# Patient Record
Sex: FEMALE | Race: BLACK OR AFRICAN AMERICAN | NOT HISPANIC OR LATINO | Employment: UNEMPLOYED | URBAN - METROPOLITAN AREA
[De-identification: names, ages, dates, MRNs, and addresses within clinical notes are randomized per-mention and may not be internally consistent; named-entity substitution may affect disease eponyms.]

---

## 2018-01-10 NOTE — PROGRESS NOTES
AUG 10 2016         RE: Teresa Wood                                To: ELLA Randhawa    MR#: [id number]                                   [address]   : [address] [facility],  [address]   ENC: [id number]                             Fax: [phone]   (Exam #: [id number])      The LMP of this 38year old,  G2, P0-0-1-0 patient was unknown, her   working ALFONSO is AUG 26 2016 and the current gestational age is 42 weeks 5   days by 2rd Trimester Sono  The patient has a history of one ectopic   pregnancy  A sonographic examination was performed on AUG 10 2016 using   real time equipment  The ultrasound examination was performed using   abdominal technique  The patient has a BMI of 30 6  Her blood pressure   today was 125/82  Earliest ultrasound found in her record: 16 8w5d 16 ALFONSO      Problem list:   1  AMA - normal NIPT  2  IUGR      Cardiac motion was observed at 147 bpm       INDICATIONS      advanced maternal age   breech presentation   intrauterine growth restriction   obesity      Exam Types      Doppler study   Biophysical Profile   Amniotic Fluid Index      RESULTS      Fetus # 1 of 1   Breech presentation   Placenta Location = Fundal   No placenta previa   Placenta Grade = II      AMNIOTIC FLUID      Q1: 4 3      Q2: 1 9      Q3:          Q4:   JORGE Total = 6 2 cm   Amniotic Fluid: Normal      FETAL VESSELS                                     S/D   PI    RI    PSV   AEDV RF                                                    cm/s       Umbilical Artery           2 58  0 93  0 61       Middle Cerebral Artery     3 40  1 43  0 71      ANATOMY COMMENTS      The umbilical artery dopplers are normal for gestational age  The middle   cerebral artery dopplers are normal for gestational age  There is no   cephalization of flow seen  BIOPHYSICAL PROFILE      The Biophysical Profile score was 8/8     Breathin  Movement: 2 Tone: 2  AFV: 2      IMPRESSION      Gillespie IUP   37 weeks and 5 days by 3rd Trimester Sono  (ALFONSO=AUG 26 2016)   Breech presentation   Regular fetal heart rate of 147 bpm   Fundal placenta   No placenta previa      GENERAL COMMENT      Her follow up care should include twice weekly NST's and a once weekly   amniotic fluid assessment, along with her daily kick counts  AN US for   growth and dopplers is planned for 8/17/16  TEX Araujo M D     Maternal-Fetal Medicine   Electronically signed 08/10/16 15:17

## 2018-01-10 NOTE — PROGRESS NOTES
History of Present Illness  HPI: Please see her ultrasound report under the radiology tab  Active Problems    1  Elderly multigravida in first trimester (659 63) (O09 521)   2  Unsure of last menstrual period as reason for ultrasound scan (V28 3) (Z36)    Family History    1  No pertinent family history    2  No pertinent family history    Social History    · Never a smoker    Current Meds   1  Prenatal Vitamin TABS; TAKE 1 TABLET DAILY; Therapy: (Recorded:20Jan2016) to Recorded    Allergies    1  No Known Drug Allergies    2  No Known Environmental Allergies   3   No Known Food Allergies    Signatures   Electronically signed by : CLIFF Ibrahim MD; Apr 27 2016  9:47AM EST                       (Author)

## 2018-01-10 NOTE — PROGRESS NOTES
2016         RE: Dallin Ferrara                                To: ELLA Cowan    MR#: [id number]                                   [address]   : [address], [address]   SS#: *********                                    Fax: [phone]   ENC: [id number]   (Exam #: [id number])      The LMP of this 38year old,  G3, P0-0-1-0 patient was unknown, her   working ALFONSO is AUG 32 2016 and the current gestational age is 25 weeks 5   days by 2rd Trimester Sono  The patient has a history of one ectopic   pregnancy  A sonographic examination was performed on 2016 using   real time equipment  The ultrasound examination was performed using   abdominal & vaginal techniques  The patient has a BMI of 31 9  Her blood   pressure today was 127/80  Earliest ultrasound found in her record: 16 8w5d 16 ALFONSO      Problem list:   1  Advanced maternal age   Her early glucola was normal  NIPT and her MSAFP   were normal       Cardiac motion was observed at 164 bpm       INDICATIONS      advanced maternal age   obesity   fetal anatomical survey      Exam Types      Transvaginal   LEVEL II      RESULTS      Fetus # 1 of 1   Breech presentation   Fetal growth appeared normal   Placenta Location = Anterior   No placenta previa   Placenta Grade = I      MEASUREMENTS (* Included In Average GA)      AC              17 2 cm        21 weeks 6 days* (34%)   BPD              5 2 cm        21 weeks 6 days* (27%)   HC              20 1 cm        22 weeks 1 day * (31%)   Femur            4 1 cm        23 weeks 4 days* (57%)      Nuchal Fold      3 9 mm      Humerus          3 7 cm        22 weeks 6 days  (49%)   Radius           3 2 cm        23 weeks 6 days   Ulna             3 4 cm        23 weeks 1 day   Tibia            3 3 cm        22 weeks 1 day   (26%)   Fibula           3 2 cm        20 weeks 6 days   Foot             4 0 cm 22 weeks 5 days      Cerebellum       2 6 cm        24 weeks 4 days   Biorbit          3 5 cm        22 weeks 3 days   CisternaMagna    6 7 mm      HC/AC           1 17   FL/AC           0 24   FL/BPD          0 79   EFW (Ac/Fl/Hc)   516 grams - 1 lbs 2 oz                 (36%)      THE AVERAGE GESTATIONAL AGE is 22 weeks 2 days +/- 10 days  AMNIOTIC FLUID         Largest Vertical Pocket = 4 1 cm   Amniotic Fluid: Normal      CERVICAL EVALUATION      SUPINE      Cervical Length: 3 40 cm      OTHER TEST RESULTS           Funneling?: No             Dynamic Changes?: No        Resp  To TFP?: No      ANATOMY      Head                                    Normal   Face/Neck                               See Details   Th  Cav  Normal   Heart                                   See Details   Abd  Cav  Normal   Stomach                                 Normal   Right Kidney                            Normal   Left Kidney                             Normal   Bladder                                 Normal   Abd  Wall                               Normal   Spine                                   Not Visualized   Extrems                                 Normal   Genitalia                               Normal   Placenta                                Normal   Umbl  Cord                              Normal   Uterus                                  Normal   PCI                                     Normal      ANATOMY DETAILS      Visualized Appearing Sonographically Normal:   HEAD: (Calvarium, BPD Level, Lateral Ventricles, Choroid Plexus,   Cerebellum, Cisterna Magna);    FACE/NECK: (Neck, Nuchal Fold, Orbits,   Nose/Lips, Palate, Face);    TH  CAV : (Diaphragm);     HEART: (3 Vessel   Trachea, Four Chamber View, Proximal Left Outflow, Proximal Right Outflow,   Aortic Arch, Short Axis of Greater Vessels, Cardiac Axis, Interventricular   Septum, Interatrial Septum, Cardiac Position);    ABD  CAV , STOMACH,   RIGHT KIDNEY, LEFT KIDNEY, BLADDER, ABD  WALL, EXTREMS: (Lt Humerus, Rt   Humerus, Lt Forearm, Rt Forearm, Lt Hand, Rt Hand, Lt Femur, Rt Femur, Lt   Low Leg, Rt Low Leg, Lt Foot, Rt Foot);    GENITALIA, PLACENTA, UMBL  CORD, UTERUS, PCI      Not Visualized:   FACE/NECK: (Profile); HEART: (Ductal Arch);    SPINE      ANATOMY COMMENTS      No fetal structural abnormality or ultrasound marker for aneuploidy is   identified on the Level II ultrasound study today  The views of the spine,   profile, nasal bone and ductal arch are limited by fetal position  The   genitalia were reviewed and found to be female  The patient is aware of   the results of the fetal sex  Fetal growth and amniotic fluid volume   appear normal   Active movement of the fetal body & extremities was seen  There is no suspicion of a subchorionic bleed  The placental cord   insertion was normal/ marginal   The cervix seen by  transvaginal scan   appears normal measuring 34mms  There is no evidence for spontaneous   funneling of the cervix seen  ADNEXA      The left ovary appeared normal and measured 4 1 x 2 1 x 2 6 cm with a   volume of 11 7 cc  The right ovary was not visualized  IMPRESSION      Gillespie IUP   22 weeks and 2 days by this ultrasound  (ALFONSO=AUG 29 2016)   22 weeks and 5 days by 3rd Trimester Sono  (ALFONSO=AUG 26 2016)   Breech presentation   Fetal growth appeared normal   Regular fetal heart rate of 164 bpm   Anterior placenta   No placenta previa      GENERAL COMMENT      The patient was informed of the findings and counseled about the   limitations of the exam in detecting all forms of fetal congenital   abnormalities  She denies any vaginal bleeding or uterine cramping/contractions  She does   feel fetal movement  Exam shows she is comfortable and her abdomen is non tender  Follow up recommended:   1    A follow-up growth scan is planned for 32 weeks secondary to her   age  We'll review the missed anatomy at that time      TEX Villaseñor M D     Maternal-Fetal Medicine   Electronically signed 04/27/16 15:44

## 2018-01-11 NOTE — RESULT NOTES
Verified Results  (Q) QNATAL (TM) ADVANCED [id number] 09:10AM Celina Chung     Test Name Result Flag Reference   NUMBER OF FETUSES 1     ADVANCED MATERNAL AGE? YES     ABNORMAL RADHA? NOT GIVEN     ABNORMAL US? NOT GIVEN     PERSONAL/FAM HISTORY? NOT GIVEN     INTERPRETATION      This specimen showed expected representation of chromosome  21, 18, and 13 material    TRISOMY 21 (T21) Negative     TRISOMY 18 (T18) Negative     TRISOMY 13 (T13) Negative     T CHROMOSOME Not detected     T CHR  INTERPRETATION      Consistent with a female fetus  SEX CHROMOSOME No aneuploidy     SEX CHROMOSOME INTERP      No apparent abnormality was detected  See "Limitations"  below  MICRODELETION Not detected     MICRODELETION INTERP      No apparent abnormality was detected  See "Limitations"  below  GESTATION AGE (IN WEEKS) 12     GESTATIONAL AGE (IN DAYS) 5     FETAL FRACTION Sufficient     LABORATORY COMMENTS      Laboratory results and submitted clinical information  reviewed by Bashir Flores MD, [place], [place] [facility]  LIMITATIONS      LIMITATIONS  This test has been validated on women with a reyes  pregnancy that is >=10 weeks gestational age  As such, the  accuracy of this test for specimens drawn at less than 10  weeks gestation is unknown  In addition, there are limited  data available for the performance of this test in multiple  gestation pregnancies and for the detection of  microdeletions  Specimens are analyzed for aneuploidies  involving chromosomes 21, 18, 13, X, and Y, and  microdeletions of the specified regions only  The Y  chromosome is analyzed for the determination of fetal sex,  and the sensitivity and specificity of this analysis may be  less than that of the autosome analysis  Sex chromosome  aneuploidy analysis is not performed for multiple gestation  pregnancies   Aneuploidies involving chromosomes other than  those specified above or abnormalities involving chromosomal  regions other than those specified are not included in this  testing  While the results of this test are highly accurate,  not all of the chromosome abnormalities interrogated may be  detected due to maternal, placental, or fetal mosaicism, or  other unexplained causes  The accuracy of the test results  may also be affected by the presence of chromosome  abnormalities or copy number variations that are maternal in  origin, or by vanishing twin syndrome in a multiple  gestation pregnancy  Circulating cell-free fetal DNA  screening does not replace the precision of diagnosis using  chorionic villus sampling or amniocentesis  It does not  assess the risk of fetal anomalies such as neural tube  defects or ventral wall defects, and should not be  considered in isolation from other clinical findings and  laboratory test results  Management decisions, including  pregnancy termination, should not be based solely on the  results of cell-free DNA screening  A negative test result  does not ensure an unaffected pregnancy  The healthcare  provider is responsible for the use of this information in  the management of his/her patient  Health care providers, please contact your local [facility] genetic counselor or call [employer] [facility] [employer] [facility]  [facility] at [employer] ([phone]) for assistance with  interpretation of these results  SPECIFICATIONS      Sensitivity           Specificity  T21           99 1%                 99 9%           CI:[96 3%-99 8%]     CI:[99 6%-99 9%]  T18          >99 9%                 99 6%           CI:[92 4%-100%]      CI:[99 2%-99 8%]  T13           91 7%                  99  7%           CI:[59 7-99 6%]      CI:[99 3%-99 9%]              Accuracy  Y             99 4%           CI:[99 0-99 6%]   METHODOLOGY      Circulating cell-free (cf) DNA was isolated from plasma  It  was then detected on a massively parallel sequencing  platform   Bioinformatic analysis was performed to determine  the representation of fetal DNA in the specimen, especially  fetal material from chromosomes 21, 18, and 13  The  representation of other fetal material, including the sex  chromosomes (X and Y) and select chromosomal regions (22q,  15q, 11q, 8q, 5p, 4p 1p), was also evaluated and will only  be reported as "Additional Chromosome Results" when an  abnormality is detected  This test was developed and its  performance characteristics have been determined by [facility], [facility] [address]  It has  not been cleared or approved by the [place]  Food and Drug  Administration  The FDA has determined that such clearance  or approval is not necessary  Performance characteristics  refer to the analytical performance of the test  This test  is performed pursuant to a license agreement with [facility]  This test was developed and its analytical performance  characteristics [facility]  It has not been  cleared or approved by FDA  This assay has been validated  pursuant to the CLIA regulations and is used for clinical  purposes

## 2018-01-15 NOTE — PROGRESS NOTES
AUG 17 2016         RE: Bebe Saravia                                To: ELLA Cox    MR#: [id number]                                   [address]   : [address], [address]   SS#: *********                                    Fax: [phone]   ENC:   (Exam #: [id number])      The LMP of this 38year old,  G2, P0-0-1-0 patient was unknown, her   working ALFONSO is AUG 26 2016 and the current gestational age is 37 weeks 5   days by 2rd Trimester Sono  The patient has a history of one ectopic   pregnancy  A sonographic examination was performed on AUG 17 2016 using   real time equipment  The ultrasound examination was performed using   abdominal technique  The patient has a BMI of 30 6  Her blood pressure   today was 130/81  Earliest ultrasound found in her record: 16 8w5d 16 ALFONSO      Problem list:   1  AMA - normal NIPT  2  IUGR resolved      Cardiac motion was observed at 141 bpm       INDICATIONS      advanced maternal age   breech presentation   intrauterine growth restriction   obesity   fetal growth   missed anatomy follow up      Exam Types      Doppler study   Level I      RESULTS      Fetus # 1 of 1   Breech presentation   Fetal growth appeared normal   Placenta Location = Fundal   No placenta previa   Placenta Grade = II      MEASUREMENTS (* Included In Average GA)      AC              31 6 cm        35 weeks 5 days* (7%)   BPD              9 0 cm        36 weeks 4 days* (30%)   HC              33 4 cm        37 weeks 4 days* (35%)   Femur            7 3 cm        36 weeks 6 days* (37%)      Humerus          6 1 cm        35 weeks 2 days  (17%)      Cerebellum       5 0 cm        36 weeks 4 days      HC/AC           1 06   FL/AC           0 23   FL/BPD          0 81   EFW (Ac/Fl/Hc)  2943 grams - 6 lbs 8 oz                 (19%)      THE AVERAGE GESTATIONAL AGE is 36 weeks 5 days +/- 21 days        AMNIOTIC FLUID      Q1: 1 7      Q2: 2 5      Q3: 2 3      Q4: 1 6   JORGE Total = 8 0 cm   Amniotic Fluid: Normal      FETAL VESSELS                                     S/D   PI    RI    PSV   AEDV RF                                                    cm/s       Umbilical Artery                 0 93              No   No       Middle Cerebral Artery     4 19  1 46  0 76        No      ANATOMY DETAILS      Visualized Appearing Sonographically Normal:   HEAD: (Calvarium, BPD Level, Lateral Ventricles);    STOMACH, RIGHT   KIDNEY, LEFT KIDNEY, BLADDER, PLACENTA      Visualized:   HEAD: (Cerebellum)      Not Visualized:   HEAD: (Cavum); HEART: (Four Chamber View, Proximal Left Outflow,   Proximal Right Outflow, Cardiac Axis, Cardiac Position)      ANATOMY COMMENTS             The views of the Spine which was limited in the prior anatomic evaluation   is still limited today and therefore the fetal anatomic survey could still   not be completed  BIOPHYSICAL PROFILE      The Biophysical Profile score was 8/8  Breathin  Movement: 2  Tone: 2  AFV: 2      IMPRESSION      Gillespie IUP   36 weeks and 5 days by this ultrasound  (ALFONSO=SEP 9 2016)   38 weeks and 5 days by 3rd Trimester Sono  (ALFONSO=AUG 26 2016)   Breech presentation   Fetal growth appeared normal   Regular fetal heart rate of 141 bpm   Fundal placenta   No placenta previa      GENERAL COMMENT      On exam today the patient appears well, in no acute distress, and denies   any complaints  Her abdomen is non-tender  There has been appropriate interval fetal growth  There is no longer   concern for fetal growth restriction  Good fetal movement and tone are   seen    The amniotic fluid volume appears normal   The placenta is fundal   and it appears sonographically normal   The anatomic structures listed   above could not be optimally imaged today because of fetal position;   however, these structures were seen on a prior ultrasound and appeared sonographically normal   The patient was informed of today's findings and   all of her questions were answered  The limitations of ultrasound were   reviewed with the patient  Recommend further ultrasounds as clinically indicated  Recommend   discontinuation of nonstress testing  Thank you very much for allowing us to participate in the care of this   very nice patient  Should you have any questions, please do not hesitate   to contact our office  TEX Vega M D     Electronically signed 08/17/16 10:36

## 2018-01-16 NOTE — PROGRESS NOTES
2016         RE: Cheri Murphy                                 To: Jaja Perera, M D    MR#: [id number]                                  [address] [place] [address] Drive   : [address], [address]   SS#:                                              Fax: [phone]   ENC:   (Exam #: [id number])      The LMP of this 38year old,  1, para 0 patient was unknown, her   working ALFONSO is AUG 26 2016 and the current gestational age is 11 weeks 5   days by [address] [place] [address]  A sonographic examination was performed on 2016 using real time equipment  The ultrasound examination was   performed using abdominal & vaginal techniques  The patient has a BMI of   29 3  Her blood pressure today was 122/77  MFM ultrasound 16   8w5d   ALFONSO   16      Patient reports having one other pregnancy  Her records show she had 1   ectopic and 1 spontaneous miscarriage  She was very slow today and could   not elaborate on whether she had an ectopic or not  She reports a history   of depression and anxiety  She is also advanced maternal age  She denies   being on medication for depression in the pregnancy  She reports being on   PNV and denies any allergies  She denies any past medical or surgical   history  She denies any family hx of hypertension, diabetes, thrombosis or   fetal anomalies  Multiple longitudinal and transverse sections revealed a   reyes intrauterine pregnancy  A normal gestational sac was documented  A normal fetal pole was   visualized  Cardiac motion was observed at 150 bpm  The yolk sac was seen,   measuring 0 53 cm  The amnion was also documented  INDICATIONS      viability      Exam Types      Level I   Transvaginal      MEASUREMENTS (* Included In Average GA)      CRL              2 1 cm        8 weeks 5 days *      THE AVERAGE GESTATIONAL AGE is 8 weeks 5 days +/- 5 days        ADNEXA      The left ovary appeared normal and measured 2 3 x 2 1 x 2 0 cm with a   volume of 5 1 cc  The right ovary was not visualized  IMPRESSION      Gillespie IUP   8 weeks and 5 days by this ultrasound  (ALFONSO=AUG 26 2016)   8 weeks and 5 days by 1st Tri Sono  (ALFONSO=AUG 26 2016)   Regular fetal heart rate of 150 bpm      GENERAL COMMENT      As per your request, a consultation was performed on your patient for the   following indication: AMA- Advanced Maternal Age      Risks:   1  AMA- with a  1 % risk for any type of aneuploidy at the time of an   amniocentesis  It was very hard to  patient today about her age   related risks  She is limited mentally  She does not think she would stop   a pregnancy if a baby was mentally slow or retarded  She is not sure if   she would stop a pregnancy for a malformation  I tried to review the following with her that I normally cover with all   patients but I am not sure she grasped my discussion  When I told her the   risk for aneuploidy is 1% or small, she said yes the baby looks very small   on the US today  I discussed that a Sequential Screen may miss 10% of the babies with   Downs syndrome or Trisomy 25 and that it is not a great screen for other   chromosome problems which her age puts her at risk for  Ultrasound is also   not a good screen for aneuploidy as it may miss up to 50% of the babies   with a chromosome problem  High risk patients such as AMA patients, those   with family hx of aneuploidy, an abnormal result on the other genetic   screens or US evidence for malformations have other options such as   invasive genetic screening on the placenta/amniotic fluid or Cell free DNA   screening completed on maternal blood  Cell free DNA screening picks up   99% of babies with Downs and 97% of babies with T18  The false positive   risk for Downs and T18 if found is 0 1%  This test also can  7 of   11 babies with T13 with a false positive rate of 0 2%   This noninvasive   test though does not screen for all types of aneuploidy and needs to be   confirmed by invasive genetic testing  Invasive genetic testing includes   an amniocentesis or CVS and yields the most information on all of the   chromosomes  Risks and benefits of both invasive procedures were reviewed   including an increased risk of loss of 0 5% over the baseline risk  It was   explained that normal chromosomes and a normal ultrasound do not guarantee   a normal baby nor a normal pregnancy outcome  Patient verbalized   understanding and wanted to proceed with Cell free DNA testing  The patient was informed of the findings and counseled about the   limitations of the exam in detecting all forms of fetal congenital   abnormalities  She denies any vaginal bleeding or uterine cramping/contractions  Exam shows she is comfortable and her abdomen is nontender  Follow up recommended:   1  She will return for a 12 week US to review the nuchal area and to have   NIPT drawn  Fetal Level II ultrasound imaging is recommended at 19-20   weeks' gestation  2  Recommend a 32 week growth scan due to the patients risk factor of   being advanced maternal age  3  I recommend a follow-up MSAFP at 16-18 weeks as a Cell free DNA   screening does not screen for neural tube defects  The later can be   ordered locally  TEX Ortiz , ELLA Arboleda     Maternal-Fetal Medicine   Electronically signed 01/20/16 17:27

## 2018-07-11 ENCOUNTER — OFFICE VISIT (OUTPATIENT)
Dept: OBGYN CLINIC | Facility: CLINIC | Age: 38
End: 2018-07-11
Payer: COMMERCIAL

## 2018-07-11 VITALS
HEIGHT: 65 IN | BODY MASS INDEX: 30.66 KG/M2 | WEIGHT: 184 LBS | SYSTOLIC BLOOD PRESSURE: 120 MMHG | DIASTOLIC BLOOD PRESSURE: 80 MMHG

## 2018-07-11 DIAGNOSIS — N94.6 DYSMENORRHEA: Primary | ICD-10-CM

## 2018-07-11 PROCEDURE — 99213 OFFICE O/P EST LOW 20 MIN: CPT | Performed by: NURSE PRACTITIONER

## 2018-07-11 RX ORDER — NAPROXEN 500 MG/1
500 TABLET ORAL 2 TIMES DAILY WITH MEALS
Qty: 30 TABLET | Refills: 3 | Status: SHIPPED | OUTPATIENT
Start: 2018-07-11 | End: 2018-07-30 | Stop reason: SDDI

## 2018-07-11 NOTE — PROGRESS NOTES
PROBLEM GYNECOLOGICAL VISIT    Feliciano Palomo is a 44 y o  female who presents today with complaint of painful periods  Her general medical history has been reviewed and she reports it as follows:    Past Medical History:   Diagnosis Date    Depression      Past Surgical History:   Procedure Laterality Date     SECTION  2016     OB History      Para Term  AB Living    1 1 1 0 0 1    SAB TAB Ectopic Multiple Live Births    0 0 0 0 1        Social History   Substance Use Topics    Smoking status: Never Smoker    Smokeless tobacco: Never Used    Alcohol use No       Current Outpatient Prescriptions: no current medications    History of Present Illness:   Reports painful menses for past year that come every month and last 4 days; last dose Depoprovera was 3/2017; no contraception since then - not sexually active    Review of Systems:  /80 (BP Location: Right arm, Patient Position: Sitting)   Ht 5' 5" (1 651 m)   Wt 83 5 kg (184 lb)   LMP 2018 (Exact Date)   BMI 30 62 kg/m²   Review of Systems   Constitutional: Negative  Gastrointestinal: Negative  Genitourinary: Positive for menstrual problem and pelvic pain  Negative for difficulty urinating and vaginal discharge  Skin: Negative  Physical Exam:  OBGyn Exam deferred as patient currently has her menses    Assessment:   1  dysmenorrhea    Plan:   1  Given Rx Naproxen for prn use  2  Discussed option of OCP but patient decline at this time  3  Return to office 2019 for annual and prn

## 2018-07-12 ENCOUNTER — TELEPHONE (OUTPATIENT)
Dept: OBGYN CLINIC | Facility: CLINIC | Age: 38
End: 2018-07-12

## 2018-07-12 DIAGNOSIS — N94.6 DYSMENORRHEA: Primary | ICD-10-CM

## 2018-07-12 RX ORDER — NORETHINDRONE ACETATE AND ETHINYL ESTRADIOL 1MG-20(21)
1 KIT ORAL DAILY
Qty: 28 TABLET | Refills: 3 | Status: SHIPPED | OUTPATIENT
Start: 2018-07-12 | End: 2018-07-30 | Stop reason: SDDI

## 2018-07-12 NOTE — TELEPHONE ENCOUNTER
I spoke with patient and she tells me that Naproxen has not helped much but menses should be done tomorrow  She has thought about it and decided she would like to try OCP's to help with dysmenorrhea as we discussed at her office visit yesterday  Rx Junel Fe 1/20 sent to her pharmacy and I advised her to start today  Return for follow up appointment in 3 months  She agrees

## 2018-07-30 ENCOUNTER — OFFICE VISIT (OUTPATIENT)
Dept: FAMILY MEDICINE CLINIC | Facility: CLINIC | Age: 38
End: 2018-07-30
Payer: COMMERCIAL

## 2018-07-30 VITALS
RESPIRATION RATE: 20 BRPM | WEIGHT: 183.8 LBS | HEART RATE: 100 BPM | DIASTOLIC BLOOD PRESSURE: 80 MMHG | BODY MASS INDEX: 32.57 KG/M2 | HEIGHT: 63 IN | OXYGEN SATURATION: 100 % | TEMPERATURE: 98.4 F | SYSTOLIC BLOOD PRESSURE: 110 MMHG

## 2018-07-30 DIAGNOSIS — N30.00 ACUTE CYSTITIS WITHOUT HEMATURIA: Primary | ICD-10-CM

## 2018-07-30 LAB
SL AMB  POCT GLUCOSE, UA: NEGATIVE
SL AMB LEUKOCYTE ESTERASE,UA: ABNORMAL
SL AMB POCT BILIRUBIN,UA: NEGATIVE
SL AMB POCT BLOOD,UA: NEGATIVE
SL AMB POCT CLARITY,UA: ABNORMAL
SL AMB POCT COLOR,UA: ABNORMAL
SL AMB POCT KETONES,UA: NEGATIVE
SL AMB POCT NITRITE,UA: NEGATIVE
SL AMB POCT PH,UA: 5
SL AMB POCT SPECIFIC GRAVITY,UA: 1.03
SL AMB POCT URINE HCG: NEGATIVE
SL AMB POCT URINE PROTEIN: ABNORMAL
SL AMB POCT UROBILINOGEN: NEGATIVE

## 2018-07-30 PROCEDURE — 81002 URINALYSIS NONAUTO W/O SCOPE: CPT | Performed by: FAMILY MEDICINE

## 2018-07-30 PROCEDURE — 99213 OFFICE O/P EST LOW 20 MIN: CPT | Performed by: FAMILY MEDICINE

## 2018-07-30 PROCEDURE — 81025 URINE PREGNANCY TEST: CPT | Performed by: FAMILY MEDICINE

## 2018-07-30 RX ORDER — NITROFURANTOIN 25; 75 MG/1; MG/1
100 CAPSULE ORAL 2 TIMES DAILY
Qty: 14 CAPSULE | Refills: 0 | Status: SHIPPED | OUTPATIENT
Start: 2018-07-30 | End: 2019-03-25 | Stop reason: ALTCHOICE

## 2018-07-31 ENCOUNTER — APPOINTMENT (OUTPATIENT)
Dept: LAB | Facility: HOSPITAL | Age: 38
End: 2018-07-31
Payer: COMMERCIAL

## 2018-07-31 DIAGNOSIS — N30.00 ACUTE CYSTITIS WITHOUT HEMATURIA: ICD-10-CM

## 2018-07-31 LAB
AMORPH URATE CRY URNS QL MICRO: ABNORMAL /HPF
BACTERIA UR QL AUTO: ABNORMAL /HPF
BILIRUB UR QL STRIP: NEGATIVE
CAOX CRY URNS QL MICRO: ABNORMAL /HPF
CLARITY UR: ABNORMAL
COLOR UR: YELLOW
GLUCOSE UR STRIP-MCNC: NEGATIVE MG/DL
HGB UR QL STRIP.AUTO: NEGATIVE
KETONES UR STRIP-MCNC: NEGATIVE MG/DL
LEUKOCYTE ESTERASE UR QL STRIP: ABNORMAL
NITRITE UR QL STRIP: NEGATIVE
NON-SQ EPI CELLS URNS QL MICRO: ABNORMAL /HPF
PH UR STRIP.AUTO: 5.5 [PH] (ref 4.5–8)
PROT UR STRIP-MCNC: ABNORMAL MG/DL
RBC #/AREA URNS AUTO: ABNORMAL /HPF
SP GR UR STRIP.AUTO: 1.03 (ref 1–1.03)
UROBILINOGEN UR QL STRIP.AUTO: 1 E.U./DL
WBC #/AREA URNS AUTO: ABNORMAL /HPF

## 2018-07-31 PROCEDURE — 81001 URINALYSIS AUTO W/SCOPE: CPT | Performed by: FAMILY MEDICINE

## 2018-07-31 PROCEDURE — 87086 URINE CULTURE/COLONY COUNT: CPT

## 2018-08-01 NOTE — PROGRESS NOTES
Assessment/Plan:      Diagnoses and all orders for this visit:    Acute cystitis without hematuria  Comments:  Increase fluid intake  Take Macrobid as directed  Call if not better or worse  Orders:  -     POCT urine dip  -     POCT urine HCG  -     nitrofurantoin (MACROBID) 100 mg capsule; Take 1 capsule (100 mg total) by mouth 2 (two) times a day  -     UA (URINE) with reflex to Microscopic  -     Urine culture; Future  -     Urine Microscopic          Subjective:     Patient ID: Gadiel Collier is a 44 y o  female  Acute Care  Patient complain of urinary frequency and slight lower pelvic pressure for 2 weeks  Admit to slight dysuria     Patient has usual clear vaginal discharge and no change recently  Denied low back pain, hematuria, nausea ,vomiting fever or chills          Review of Systems   Constitutional: Negative for chills, diaphoresis and fatigue  HENT: Negative for ear pain, sore throat, trouble swallowing and voice change  Eyes: Negative for visual disturbance  Respiratory: Negative for cough, chest tightness and shortness of breath  Cardiovascular: Negative for chest pain, palpitations and leg swelling  Gastrointestinal: Negative for abdominal pain, blood in stool, constipation, diarrhea and nausea  Endocrine: Negative for polydipsia and polyuria  Genitourinary: Negative for decreased urine volume, flank pain, hematuria, pelvic pain, urgency, vaginal bleeding and vaginal pain  Musculoskeletal: Negative for arthralgias, back pain, gait problem, myalgias and neck pain  Neurological: Negative for dizziness, tremors, seizures, weakness, light-headedness, numbness and headaches  Hematological: Negative for adenopathy  Does not bruise/bleed easily  Psychiatric/Behavioral: Negative for confusion  Objective:     Physical Exam   Constitutional: She is oriented to person, place, and time  She appears well-developed and well-nourished  HENT:   Head: Normocephalic     Eyes: No scleral icterus  Neck: Neck supple  No JVD present  No thyromegaly present  Cardiovascular: Normal rate and regular rhythm  No murmur heard  Pulmonary/Chest: Effort normal and breath sounds normal    Abdominal: Soft  Bowel sounds are normal  She exhibits no distension and no mass  There is no tenderness  There is no rebound and no guarding  No CVA tenderness   Musculoskeletal: She exhibits no edema or tenderness  Lymphadenopathy:     She has no cervical adenopathy  Neurological: She is alert and oriented to person, place, and time  No cranial nerve deficit  She exhibits normal muscle tone  Skin: No rash noted  No erythema  No pallor  Psychiatric: She has a normal mood and affect   Her behavior is normal  Judgment and thought content normal

## 2018-08-02 ENCOUNTER — TELEPHONE (OUTPATIENT)
Dept: OBGYN CLINIC | Facility: CLINIC | Age: 38
End: 2018-08-02

## 2018-08-02 DIAGNOSIS — R10.2 PELVIC PAIN: Primary | ICD-10-CM

## 2018-08-02 LAB — BACTERIA UR CULT: NORMAL

## 2018-08-02 NOTE — TELEPHONE ENCOUNTER
Patient called in and reports to me that is still having pelvic pain despite Naproxen use  States she does not want to take OCP as prescribed as she desires to conceive  She is begging for narcotic pain prescription  I advised her that I would like to get pelvic ultrasound done  She should come back to see me to review ultrasound results then

## 2018-08-09 DIAGNOSIS — R82.90 ABNORMAL URINALYSIS: Primary | ICD-10-CM

## 2018-08-30 ENCOUNTER — OFFICE VISIT (OUTPATIENT)
Dept: FAMILY MEDICINE CLINIC | Facility: CLINIC | Age: 38
End: 2018-08-30
Payer: COMMERCIAL

## 2018-08-30 VITALS
WEIGHT: 186.2 LBS | SYSTOLIC BLOOD PRESSURE: 120 MMHG | RESPIRATION RATE: 20 BRPM | OXYGEN SATURATION: 99 % | BODY MASS INDEX: 31.02 KG/M2 | DIASTOLIC BLOOD PRESSURE: 80 MMHG | TEMPERATURE: 97.2 F | HEIGHT: 65 IN | HEART RATE: 92 BPM

## 2018-08-30 DIAGNOSIS — R82.90 ABNORMAL URINE FINDING: ICD-10-CM

## 2018-08-30 DIAGNOSIS — E66.9 CLASS 1 OBESITY WITHOUT SERIOUS COMORBIDITY WITH BODY MASS INDEX (BMI) OF 30.0 TO 30.9 IN ADULT, UNSPECIFIED OBESITY TYPE: ICD-10-CM

## 2018-08-30 DIAGNOSIS — I10 ESSENTIAL HYPERTENSION: Primary | ICD-10-CM

## 2018-08-30 PROCEDURE — 3079F DIAST BP 80-89 MM HG: CPT | Performed by: FAMILY MEDICINE

## 2018-08-30 PROCEDURE — 3074F SYST BP LT 130 MM HG: CPT | Performed by: FAMILY MEDICINE

## 2018-08-30 PROCEDURE — 3008F BODY MASS INDEX DOCD: CPT | Performed by: FAMILY MEDICINE

## 2018-08-30 PROCEDURE — 99214 OFFICE O/P EST MOD 30 MIN: CPT | Performed by: FAMILY MEDICINE

## 2018-08-30 NOTE — PATIENT INSTRUCTIONS
The patient to follow up with her test results   as far as prescription for hydrocodone ,was not given, and was told this is not a simple medication to give for pain  it is narcotic and people could get addicted to it and should be used only when there is significant pain   as far as  completing her permit application  Patient has  History of mental issue , not sure about the exact diagnosis    so she was advised to contact her Psychiatrist  Regarding her diasgnosis

## 2018-08-30 NOTE — PROGRESS NOTES
Assessment/Plan:      Diagnoses and all orders for this visit:    Essential hypertension  Comments: To follow with the dash diet  Orders:  -     CBC and differential; Future  -     Comprehensive metabolic panel; Future  -     Lipid panel; Future  -     ECG 12 lead; Future    Abnormal urine finding  -     Urinalysis with reflex to microscopic    Class 1 obesity without serious comorbidity with body mass index (BMI) of 30 0 to 30 9 in adult, unspecified obesity type  Comments:   about weight loss  Advised to wash her diet  And to walk half an hour daily          Subjective:     Patient ID: Annel Egan is a 44 y o  female  Patient is here for follow-up  Hypertension  Denied headache or dizziness  Admit to  Regular salt intake  Dysuria  Patient denied any further dysuria and has she has no further lower abdominal pressure  Obesity patient denied  Watching her diet or exercising  Denied fatigue, cold intolerance or hair loss   patient request to  Complete driving permit form   patient asked me to give her prescription for hydrocodone just in case if she has pain so she can use it  Patient denied any present pain  She said  She heard about this medication on T            Review of Systems   Constitutional: Negative for chills, diaphoresis and fatigue  HENT: Negative for ear pain, sore throat, trouble swallowing and voice change  Eyes: Negative for visual disturbance  Respiratory: Negative for cough, chest tightness and shortness of breath  Cardiovascular: Negative for chest pain, palpitations and leg swelling  Gastrointestinal: Negative for abdominal pain, blood in stool, constipation, diarrhea and nausea  Endocrine: Negative for polydipsia and polyuria  Genitourinary: Negative for dysuria, flank pain, frequency, hematuria, pelvic pain, urgency, vaginal bleeding and vaginal discharge  Musculoskeletal: Negative for arthralgias, back pain, gait problem, myalgias and neck pain  Neurological: Negative for dizziness, tremors, seizures, weakness, light-headedness, numbness and headaches  Hematological: Negative for adenopathy  Does not bruise/bleed easily  Psychiatric/Behavioral: Negative for confusion  Objective:     Physical Exam   Constitutional: She is oriented to person, place, and time  She appears well-developed and well-nourished  HENT:   Head: Normocephalic  Eyes: No scleral icterus  Neck: Neck supple  No JVD present  No thyromegaly present  Cardiovascular: Normal rate and regular rhythm  No murmur heard  Pulmonary/Chest: Effort normal and breath sounds normal    Abdominal: Soft  Bowel sounds are normal  She exhibits no distension and no mass  There is no tenderness  There is no rebound and no guarding  Musculoskeletal: She exhibits no edema or tenderness  Lymphadenopathy:     She has no cervical adenopathy  Neurological: She is alert and oriented to person, place, and time  No cranial nerve deficit  She exhibits normal muscle tone  Skin: No rash noted  No erythema  No pallor  Psychiatric: She has a normal mood and affect   Her behavior is normal  Judgment and thought content normal

## 2019-03-11 ENCOUNTER — TELEPHONE (OUTPATIENT)
Dept: OBGYN CLINIC | Facility: CLINIC | Age: 39
End: 2019-03-11

## 2019-03-25 ENCOUNTER — TELEPHONE (OUTPATIENT)
Dept: OTHER | Facility: OTHER | Age: 39
End: 2019-03-25

## 2019-03-25 ENCOUNTER — ANNUAL EXAM (OUTPATIENT)
Dept: OBGYN CLINIC | Facility: CLINIC | Age: 39
End: 2019-03-25

## 2019-03-25 VITALS
HEIGHT: 65 IN | BODY MASS INDEX: 27.32 KG/M2 | DIASTOLIC BLOOD PRESSURE: 80 MMHG | WEIGHT: 164 LBS | SYSTOLIC BLOOD PRESSURE: 140 MMHG

## 2019-03-25 DIAGNOSIS — Z12.4 PAP SMEAR FOR CERVICAL CANCER SCREENING: ICD-10-CM

## 2019-03-25 DIAGNOSIS — N62 LARGE BREASTS: ICD-10-CM

## 2019-03-25 DIAGNOSIS — Z01.419 ENCOUNTER FOR ANNUAL ROUTINE GYNECOLOGICAL EXAMINATION: Primary | ICD-10-CM

## 2019-03-25 DIAGNOSIS — Z30.41 ENCOUNTER FOR SURVEILLANCE OF CONTRACEPTIVE PILLS: ICD-10-CM

## 2019-03-25 DIAGNOSIS — Z11.3 ROUTINE SCREENING FOR STI (SEXUALLY TRANSMITTED INFECTION): ICD-10-CM

## 2019-03-25 PROCEDURE — 87624 HPV HI-RISK TYP POOLED RSLT: CPT | Performed by: FAMILY MEDICINE

## 2019-03-25 PROCEDURE — 87491 CHLMYD TRACH DNA AMP PROBE: CPT | Performed by: FAMILY MEDICINE

## 2019-03-25 PROCEDURE — 99395 PREV VISIT EST AGE 18-39: CPT | Performed by: FAMILY MEDICINE

## 2019-03-25 PROCEDURE — 87591 N.GONORRHOEAE DNA AMP PROB: CPT | Performed by: FAMILY MEDICINE

## 2019-03-25 PROCEDURE — G0145 SCR C/V CYTO,THINLAYER,RESCR: HCPCS | Performed by: FAMILY MEDICINE

## 2019-03-25 RX ORDER — NORETHINDRONE ACETATE AND ETHINYL ESTRADIOL 1MG-20(21)
1 KIT ORAL DAILY
Qty: 28 TABLET | Refills: 3 | Status: SHIPPED | OUTPATIENT
Start: 2019-03-25 | End: 2021-01-05

## 2019-03-25 NOTE — TELEPHONE ENCOUNTER
Message for [facility],   Needs to verify what the patient needs to be seen for  She was not specific  Please call back at your earliest convenience

## 2019-03-25 NOTE — PROGRESS NOTES
Assessment/Plan:     Encounter for annual routine gynecological examination  - PAP smear performed  - GC/CT  - Advised use of padded bra/ support for breasts  Per d/w patient referral placed to breast surgery   - Patient desires contraception  Was on depo  Does not want any injections/ IUD  Prescribed Junel    - RTC for pill check in 3 months           Diagnoses and all orders for this visit:    Encounter for annual routine gynecological examination    Pap smear for cervical cancer screening  -     Liquid-based pap, screening    Routine screening for STI (sexually transmitted infection)  -     Chlamydia/GC amplified DNA by PCR; Future  -     Chlamydia/GC amplified DNA by PCR    Large breasts  -     Ambulatory Referral to Breast Surgery; Future    Encounter for surveillance of contraceptive pills  -     norethindrone-ethinyl estradiol (JUNEL FE 1/20) 1-20 MG-MCG per tablet; Take 1 tablet by mouth daily              Subjective:      Patient ID: Guillermo Cha is a 43 y o  female  43 y o female presents for annual Gyn visit  Age at menarche unknown  Regular menses  Q Monthly, 4 day flow  LMP 3/10/2019  Was on Depo shots in past but stopped getting the shots  Denies any H/O abnormal PAP  C/O vaginal discharge x white, no foul smell, no vaginal irritation  States this is chronic, denies any change in discharge  Denies any lower abdomen pain/ fever/ chills/ dysuria/ hematuria  Patient is [id number]  C section x 2017    No breast swelling/ nipple discharge  Denies any family H/O cancers    Denies any smoking/ alcohol use  Concerned with breast size  Causing her back pain and discomfort   Would like to see specialist for possible breast reduction      The following portions of the patient's history were reviewed and updated as appropriate: allergies, current medications, past family history, past medical history, past social history, past surgical history and problem list     Review of Systems Constitutional: Negative for chills and fever  Gastrointestinal: Negative for abdominal pain, diarrhea, nausea and vomiting  Genitourinary: Positive for vaginal discharge (Chronic)  Negative for difficulty urinating, dyspareunia, dysuria, frequency, hematuria, menstrual problem, pelvic pain, vaginal bleeding and vaginal pain  Skin: Negative for rash  Hematological: Negative for adenopathy  Objective:      /80   Ht 5' 5" (1 651 m)   Wt 74 4 kg (164 lb)   LMP 03/10/2019 (Exact Date)   BMI 27 29 kg/m²          Physical Exam   Constitutional: She appears well-developed and well-nourished  No distress  Pulmonary/Chest: Right breast exhibits no inverted nipple, no mass, no nipple discharge, no skin change and no tenderness  Left breast exhibits no inverted nipple, no mass, no nipple discharge, no skin change and no tenderness  Abdominal: Soft  She exhibits no distension  There is no tenderness  There is no rebound  Genitourinary: Uterus normal  No labial fusion  There is no rash, tenderness, lesion or injury on the right labia  There is no rash, tenderness, lesion or injury on the left labia  Cervix exhibits no motion tenderness, no discharge and no friability  Right adnexum displays no mass, no tenderness and no fullness  Left adnexum displays no mass, no tenderness and no fullness  No erythema, tenderness or bleeding in the vagina  No foreign body in the vagina  No signs of injury around the vagina  Vaginal discharge found  Skin: She is not diaphoretic  Vitals reviewed

## 2019-03-25 NOTE — ASSESSMENT & PLAN NOTE
- PAP smear performed  - GC/CT  - Advised use of padded bra/ support for breasts  Per d/w patient referral placed to breast surgery   - Patient desires contraception  Was on depo  Does not want any injections/ IUD  Prescribed Junel    - RTC for pill check in 3 months

## 2019-03-26 LAB
C TRACH DNA SPEC QL NAA+PROBE: NEGATIVE
N GONORRHOEA DNA SPEC QL NAA+PROBE: NEGATIVE

## 2019-03-27 LAB
HPV HR 12 DNA CVX QL NAA+PROBE: NEGATIVE
HPV16 DNA CVX QL NAA+PROBE: NEGATIVE
HPV18 DNA CVX QL NAA+PROBE: NEGATIVE

## 2019-03-28 ENCOUNTER — TELEPHONE (OUTPATIENT)
Dept: OBGYN CLINIC | Facility: CLINIC | Age: 39
End: 2019-03-28

## 2019-03-28 DIAGNOSIS — N62 LARGE BREASTS: Primary | ICD-10-CM

## 2019-03-28 LAB
LAB AP GYN PRIMARY INTERPRETATION: NORMAL
LAB AP LMP: NORMAL
Lab: NORMAL

## 2019-03-28 NOTE — TELEPHONE ENCOUNTER
Reviewed PAP smear  Negative for intraepithelial lesion  HPV negative  Repeat PAP in 5 years    GC/CT negative  Patient informed

## 2019-04-23 ENCOUNTER — TELEPHONE (OUTPATIENT)
Dept: OBGYN CLINIC | Facility: CLINIC | Age: 39
End: 2019-04-23

## 2019-04-24 ENCOUNTER — TELEPHONE (OUTPATIENT)
Dept: FAMILY MEDICINE CLINIC | Facility: CLINIC | Age: 39
End: 2019-04-24

## 2019-04-25 ENCOUNTER — TELEPHONE (OUTPATIENT)
Dept: FAMILY MEDICINE CLINIC | Facility: CLINIC | Age: 39
End: 2019-04-25

## 2019-09-24 ENCOUNTER — OFFICE VISIT (OUTPATIENT)
Dept: FAMILY MEDICINE CLINIC | Facility: CLINIC | Age: 39
End: 2019-09-24

## 2019-09-24 VITALS
DIASTOLIC BLOOD PRESSURE: 76 MMHG | WEIGHT: 162 LBS | OXYGEN SATURATION: 98 % | SYSTOLIC BLOOD PRESSURE: 110 MMHG | TEMPERATURE: 98 F | HEART RATE: 78 BPM | HEIGHT: 64 IN | RESPIRATION RATE: 16 BRPM | BODY MASS INDEX: 27.66 KG/M2

## 2019-09-24 DIAGNOSIS — Z00.00 ANNUAL PHYSICAL EXAM: Primary | ICD-10-CM

## 2019-09-24 DIAGNOSIS — Z13.220 SCREENING CHOLESTEROL LEVEL: ICD-10-CM

## 2019-09-24 DIAGNOSIS — Z13.1 SCREENING FOR DIABETES MELLITUS: ICD-10-CM

## 2019-09-24 PROCEDURE — 99385 PREV VISIT NEW AGE 18-39: CPT | Performed by: PHYSICIAN ASSISTANT

## 2019-09-24 NOTE — PROGRESS NOTES
[address] [facility]    NAME: Sheree Dear  AGE: 43 y o  SEX: female  : 1980     DATE: 2019     Assessment and Plan:     Problem List Items Addressed This Visit     None      Visit Diagnoses     Annual physical exam    -  Primary    Screening for diabetes mellitus        Relevant Orders    CBC and differential    Comprehensive metabolic panel    Screening cholesterol level        Relevant Orders    Lipid panel          Immunizations and preventive care screenings were discussed with patient today  Appropriate education was printed on patient's after visit summary  Counseling:  Dental Health: discussed importance of regular tooth brushing, flossing, and dental visits  Injury prevention: discussed safety/seat belts, safety helmets, smoke detectors, carbon dioxide detectors, and smoking near bedding or upholstery  Sexual health: discussed sexually transmitted diseases, partner selection, use of condoms, avoidance of unintended pregnancy, and contraceptive alternatives  · Exercise: the importance of regular exercise/physical activity was discussed  Recommend exercise 3-5 times per week for at least 30 minutes  No follow-ups on file  Chief Complaint:     Chief Complaint   Patient presents with    Establish Care      History of Present Illness:     Adult Annual Physical   Patient here for a comprehensive physical exam  The patient reports no problems  Denies any chronic medical conditions  Currently not taking any medications  Denies any tobacco, alcohol, drug use  Denies any previous surgical history  Diet and Physical Activity  · Diet/Nutrition: well balanced diet, limited junk food and consuming 3-5 servings of fruits/vegetables daily  · Exercise: moderate cardiovascular exercise, 1-2 times a week on average and less than 30 minutes on average        Depression Screening  PHQ-9 Depression Screening PHQ-9:    Frequency of the following problems over the past two weeks:            General Health  · Sleep: sleeps well and gets 4-6 hours of sleep on average  · Hearing: normal - bilateral   · Vision: no vision problems  · Dental: no dental visits for >1 year and brushes teeth once daily  /GYN Health  · Last menstrual period: 9/10/2019  · Contraceptive method: oral contraceptives  · History of STDs?: yes  Review of Systems:     Review of Systems   Constitutional: Negative for activity change, appetite change, chills, diaphoresis, fatigue, fever and unexpected weight change  HENT: Negative for congestion, ear pain, hearing loss, rhinorrhea and sore throat  Eyes: Negative for pain and visual disturbance  Respiratory: Negative for cough, shortness of breath and wheezing  Cardiovascular: Negative for chest pain, palpitations and leg swelling  Gastrointestinal: Negative for abdominal pain, blood in stool, constipation, diarrhea, nausea and vomiting  Endocrine: Negative for cold intolerance, heat intolerance and polyuria  Genitourinary: Negative for difficulty urinating, dysuria, frequency, hematuria and urgency  Musculoskeletal: Negative for arthralgias, joint swelling and myalgias  Skin: Negative for rash and wound  Neurological: Negative for dizziness, syncope, weakness, numbness and headaches  Psychiatric/Behavioral: Negative for dysphoric mood and sleep disturbance  The patient is not nervous/anxious         Past Medical History:     Past Medical History:   Diagnosis Date    Depression       Past Surgical History:     Past Surgical History:   Procedure Laterality Date     SECTION  2016    OTHER SURGICAL HISTORY  2013    removed boil under arm      Social History:     Social History     Socioeconomic History    Marital status: Single     Spouse name: None    Number of children: None    Years of education: None    Highest education level: None   Occupational History    None   Social Needs    Financial resource strain: None    Food insecurity:     Worry: None     Inability: None    Transportation needs:     Medical: None     Non-medical: None   Tobacco Use    Smoking status: Never Smoker    Smokeless tobacco: Never Used   Substance and Sexual Activity    Alcohol use: No    Drug use: No    Sexual activity: Yes     Partners: Male     Birth control/protection: None   Lifestyle    Physical activity:     Days per week: None     Minutes per session: None    Stress: None   Relationships    Social connections:     Talks on phone: None     Gets together: None     Attends Restorationism service: None     Active member of club or organization: None     Attends meetings of clubs or organizations: None     Relationship status: None    Intimate partner violence:     Fear of current or ex partner: None     Emotionally abused: None     Physically abused: None     Forced sexual activity: None   Other Topics Concern    None   Social History Narrative    None      Family History:     Family History   Problem Relation Age of Onset    Hypertension Family     Cancer Neg Hx       Current Medications:     Current Outpatient Medications   Medication Sig Dispense Refill    norethindrone-ethinyl estradiol (JUNEL FE 1/20) 1-20 MG-MCG per tablet Take 1 tablet by mouth daily (Patient not taking: Reported on 9/24/2019) 28 tablet 3     No current facility-administered medications for this visit  Allergies: Allergies   Allergen Reactions    No Active Allergies       Physical Exam:     /76 (BP Location: Right arm, Patient Position: Sitting, Cuff Size: Standard)   Pulse 78   Temp 98 °F (36 7 °C) (Temporal)   Resp 16   Ht 5' 3 5" (1 613 m)   Wt 73 5 kg (162 lb)   SpO2 98%   BMI 28 25 kg/m²     Physical Exam   Constitutional: She is oriented to person, place, and time  She appears well-developed and well-nourished  No distress     HENT:   Right Ear: External ear normal  Left Ear: External ear normal    Nose: Nose normal    Mouth/Throat: Oropharynx is clear and moist    Eyes: Pupils are equal, round, and reactive to light  Conjunctivae and EOM are normal    Neck: Normal range of motion  Neck supple  Cardiovascular: Normal rate, regular rhythm and normal heart sounds  Exam reveals no gallop and no friction rub  No murmur heard  Pulmonary/Chest: Effort normal and breath sounds normal  No respiratory distress  She has no wheezes  She has no rales  Abdominal: Soft  Bowel sounds are normal  She exhibits no distension  There is no tenderness  There is no rebound and no guarding  Musculoskeletal: Normal range of motion  She exhibits no edema  Lymphadenopathy:     She has no cervical adenopathy  Neurological: She is alert and oriented to person, place, and time  She displays normal reflexes  No cranial nerve deficit  She exhibits normal muscle tone  Coordination normal    Skin: She is not diaphoretic  Psychiatric: She has a normal mood and affect  Her behavior is normal  Thought content normal    Nursing note and vitals reviewed        Cathy Gordillo PA-C   [facility]

## 2019-09-24 NOTE — PATIENT INSTRUCTIONS

## 2019-09-28 ENCOUNTER — APPOINTMENT (OUTPATIENT)
Dept: LAB | Facility: HOSPITAL | Age: 39
End: 2019-09-28
Payer: COMMERCIAL

## 2019-09-28 DIAGNOSIS — Z13.1 SCREENING FOR DIABETES MELLITUS: ICD-10-CM

## 2019-09-28 DIAGNOSIS — Z13.220 SCREENING CHOLESTEROL LEVEL: ICD-10-CM

## 2019-09-28 LAB
ALBUMIN SERPL BCP-MCNC: 3.9 G/DL (ref 3.5–5)
ALP SERPL-CCNC: 59 U/L (ref 46–116)
ALT SERPL W P-5'-P-CCNC: 11 U/L (ref 12–78)
ANION GAP SERPL CALCULATED.3IONS-SCNC: 9 MMOL/L (ref 4–13)
AST SERPL W P-5'-P-CCNC: 13 U/L (ref 5–45)
BASOPHILS # BLD AUTO: 0.04 THOUSANDS/ΜL (ref 0–0.1)
BASOPHILS NFR BLD AUTO: 1 % (ref 0–1)
BILIRUB SERPL-MCNC: 0.47 MG/DL (ref 0.2–1)
BUN SERPL-MCNC: 11 MG/DL (ref 5–25)
CALCIUM SERPL-MCNC: 8.9 MG/DL (ref 8.3–10.1)
CHLORIDE SERPL-SCNC: 107 MMOL/L (ref 100–108)
CHOLEST SERPL-MCNC: 169 MG/DL (ref 50–200)
CO2 SERPL-SCNC: 27 MMOL/L (ref 21–32)
CREAT SERPL-MCNC: 0.87 MG/DL (ref 0.6–1.3)
EOSINOPHIL # BLD AUTO: 0.09 THOUSAND/ΜL (ref 0–0.61)
EOSINOPHIL NFR BLD AUTO: 2 % (ref 0–6)
ERYTHROCYTE [DISTWIDTH] IN BLOOD BY AUTOMATED COUNT: 12 % (ref 11.6–15.1)
GFR SERPL CREATININE-BSD FRML MDRD: 97 ML/MIN/1.73SQ M
GLUCOSE P FAST SERPL-MCNC: 83 MG/DL (ref 65–99)
HCT VFR BLD AUTO: 37.8 % (ref 34.8–46.1)
HDLC SERPL-MCNC: 58 MG/DL (ref 40–60)
HGB BLD-MCNC: 12.1 G/DL (ref 11.5–15.4)
IMM GRANULOCYTES # BLD AUTO: 0.02 THOUSAND/UL (ref 0–0.2)
IMM GRANULOCYTES NFR BLD AUTO: 0 % (ref 0–2)
LDLC SERPL CALC-MCNC: 105 MG/DL (ref 0–100)
LYMPHOCYTES # BLD AUTO: 1.69 THOUSANDS/ΜL (ref 0.6–4.47)
LYMPHOCYTES NFR BLD AUTO: 32 % (ref 14–44)
MCH RBC QN AUTO: 30.8 PG (ref 26.8–34.3)
MCHC RBC AUTO-ENTMCNC: 32 G/DL (ref 31.4–37.4)
MCV RBC AUTO: 96 FL (ref 82–98)
MONOCYTES # BLD AUTO: 0.38 THOUSAND/ΜL (ref 0.17–1.22)
MONOCYTES NFR BLD AUTO: 7 % (ref 4–12)
NEUTROPHILS # BLD AUTO: 3.04 THOUSANDS/ΜL (ref 1.85–7.62)
NEUTS SEG NFR BLD AUTO: 58 % (ref 43–75)
NONHDLC SERPL-MCNC: 111 MG/DL
NRBC BLD AUTO-RTO: 0 /100 WBCS
PLATELET # BLD AUTO: 298 THOUSANDS/UL (ref 149–390)
PMV BLD AUTO: 10.9 FL (ref 8.9–12.7)
POTASSIUM SERPL-SCNC: 3.8 MMOL/L (ref 3.5–5.3)
PROT SERPL-MCNC: 7.7 G/DL (ref 6.4–8.2)
RBC # BLD AUTO: 3.93 MILLION/UL (ref 3.81–5.12)
SODIUM SERPL-SCNC: 143 MMOL/L (ref 136–145)
TRIGL SERPL-MCNC: 32 MG/DL
WBC # BLD AUTO: 5.26 THOUSAND/UL (ref 4.31–10.16)

## 2019-09-28 PROCEDURE — 80061 LIPID PANEL: CPT

## 2019-09-28 PROCEDURE — 80053 COMPREHEN METABOLIC PANEL: CPT

## 2019-09-28 PROCEDURE — 85025 COMPLETE CBC W/AUTO DIFF WBC: CPT

## 2019-09-28 PROCEDURE — 36415 COLL VENOUS BLD VENIPUNCTURE: CPT

## 2020-08-20 ENCOUNTER — OFFICE VISIT (OUTPATIENT)
Dept: FAMILY MEDICINE CLINIC | Facility: CLINIC | Age: 40
End: 2020-08-20

## 2020-08-20 VITALS
SYSTOLIC BLOOD PRESSURE: 122 MMHG | TEMPERATURE: 97.7 F | WEIGHT: 174 LBS | HEART RATE: 80 BPM | RESPIRATION RATE: 18 BRPM | DIASTOLIC BLOOD PRESSURE: 78 MMHG | BODY MASS INDEX: 30.34 KG/M2 | OXYGEN SATURATION: 98 %

## 2020-08-20 DIAGNOSIS — Z00.00 HEALTHCARE MAINTENANCE: ICD-10-CM

## 2020-08-20 DIAGNOSIS — E66.9 CLASS 1 OBESITY WITHOUT SERIOUS COMORBIDITY WITH BODY MASS INDEX (BMI) OF 30.0 TO 30.9 IN ADULT, UNSPECIFIED OBESITY TYPE: ICD-10-CM

## 2020-08-20 DIAGNOSIS — Z12.31 ENCOUNTER FOR SCREENING MAMMOGRAM FOR MALIGNANT NEOPLASM OF BREAST: Primary | ICD-10-CM

## 2020-08-20 DIAGNOSIS — L85.3 DRY SKIN: ICD-10-CM

## 2020-08-20 DIAGNOSIS — Z11.4 ENCOUNTER FOR SCREENING FOR HIV: ICD-10-CM

## 2020-08-20 DIAGNOSIS — Z13.31 DEPRESSION SCREEN: ICD-10-CM

## 2020-08-20 PROCEDURE — 3074F SYST BP LT 130 MM HG: CPT | Performed by: PHYSICIAN ASSISTANT

## 2020-08-20 PROCEDURE — 99213 OFFICE O/P EST LOW 20 MIN: CPT | Performed by: PHYSICIAN ASSISTANT

## 2020-08-20 PROCEDURE — 3078F DIAST BP <80 MM HG: CPT | Performed by: PHYSICIAN ASSISTANT

## 2020-08-20 PROCEDURE — 1036F TOBACCO NON-USER: CPT | Performed by: PHYSICIAN ASSISTANT

## 2020-08-20 RX ORDER — AMMONIUM LACTATE 12 G/100G
CREAM TOPICAL AS NEEDED
Qty: 385 G | Refills: 0 | Status: SHIPPED | OUTPATIENT
Start: 2020-08-20 | End: 2021-01-05

## 2020-08-20 NOTE — PATIENT INSTRUCTIONS
- Try to purchase a pumice stone to help to remove the dead skin  -        Heart Healthy Diet   WHAT YOU NEED TO KNOW:   A heart healthy diet is an eating plan low in total fat, unhealthy fats, and sodium (salt)  A heart healthy diet helps decrease your risk for heart disease and stroke  Limit the amount of fat you eat to 25% to 35% of your total daily calories  Limit sodium to less than 2,300 mg each day  DISCHARGE INSTRUCTIONS:   Healthy fats:  Healthy fats can help improve cholesterol levels  The risk for heart disease is decreased when cholesterol levels are normal  Choose healthy fats, such as the following:  · Unsaturated fat  is found in foods such as soybean, canola, olive, corn, and safflower oils  It is also found in soft tub margarine that is made with liquid vegetable oil  · Omega-3 fat  is found in certain fish, such as salmon, tuna, and trout, and in walnuts and flaxseed  Unhealthy fats:  Unhealthy fats can cause unhealthy cholesterol levels in your blood and increase your risk of heart disease  Limit unhealthy fats, such as the following:  · Cholesterol  is found in animal foods, such as eggs and lobster, and in dairy products made from whole milk  Limit cholesterol to less than 300 milligrams (mg) each day  You may need to limit cholesterol to 200 mg each day if you have heart disease  · Saturated fat  is found in meats, such as desouza and hamburger  It is also found in chicken or turkey skin, whole milk, and butter  Limit saturated fat to less than 7% of your total daily calories  Limit saturated fat to less than 6% if you have heart disease or are at increased risk for it  · Trans fat  is found in packaged foods, such as potato chips and cookies  It is also in hard margarine, some fried foods, and shortening  Avoid trans fats as much as possible    Heart healthy foods and drinks to include:  Ask your dietitian or healthcare provider how many servings to have from each of the following food groups:  · Grains:      ¨ Whole-wheat breads, cereals, and pastas, and brown rice    ¨ Low-fat, low-sodium crackers and chips    · Vegetables:      ¨ Broccoli, green beans, green peas, and spinach    ¨ Collards, kale, and lima beans    ¨ Carrots, sweet potatoes, tomatoes, and peppers    ¨ Canned vegetables with no salt added    · Fruits:      ¨ Bananas, peaches, pears, and pineapple    ¨ Grapes, raisins, and dates    ¨ Oranges, tangerines, grapefruit, orange juice, and grapefruit juice    ¨ Apricots, mangoes, melons, and papaya    ¨ Raspberries and strawberries    ¨ Canned fruit with no added sugar    · Low-fat dairy products:      ¨ Nonfat (skim) milk, 1% milk, and low-fat almond, cashew, or soy milks fortified with calcium    ¨ Low-fat cheese, regular or frozen yogurt, and cottage cheese    · Meats and proteins , such as lean cuts of beef and pork (loin, leg, round), skinless chicken and turkey, legumes, soy products, egg whites, and nuts  Foods and drinks to limit or avoid:  Ask your dietitian or healthcare provider about these and other foods that are high in unhealthy fat, sodium, and sugar:  · Snack or packaged foods , such as frozen dinners, cookies, macaroni and cheese, and cereals with more than 300 mg of sodium per serving    · Canned or dry mixes  for cakes, soups, sauces, or gravies    · Vegetables with added sodium , such as instant potatoes, vegetables with added sauces, or regular canned vegetables    · Other foods high in sodium , such as ketchup, barbecue sauce, salad dressing, pickles, olives, soy sauce, and miso    · High-fat dairy foods  such as whole or 2% milk, cream cheese, or sour cream, and cheeses     · High-fat protein foods  such as high-fat cuts of beef (T-bone steaks, ribs), chicken or turkey with skin, and organ meats, such as liver    · Cured or smoked meats , such as hot dogs, desouza, and sausage    · Unhealthy fats and oils , such as butter, stick margarine, shortening, and cooking oils such as coconut or palm oil    · Food and drinks high in sugar , such as soft drinks (soda), sports drinks, sweetened tea, candy, cake, cookies, pies, and doughnuts  Other diet guidelines to follow:   · Eat more foods containing omega-3 fats  Eat fish high in omega-3 fats at least 2 times a week  · Limit alcohol  Too much alcohol can damage your heart and raise your blood pressure  Women should limit alcohol to 1 drink a day  Men should limit alcohol to 2 drinks a day  A drink of alcohol is 12 ounces of beer, 5 ounces of wine, or 1½ ounces of liquor  · Choose low-sodium foods  High-sodium foods can lead to high blood pressure  Add little or no salt to food you prepare  Use herbs and spices in place of salt  · Eat more fiber  to help lower cholesterol levels  Eat at least 5 servings of fruits and vegetables each day  Eat 3 ounces of whole-grain foods each day  Legumes (beans) are also a good source of fiber  Lifestyle guidelines:   · Do not smoke  Nicotine and other chemicals in cigarettes and cigars can cause lung and heart damage  Ask your healthcare provider for information if you currently smoke and need help to quit  E-cigarettes or smokeless tobacco still contain nicotine  Talk to your healthcare provider before you use these products  · Exercise regularly  to help you maintain a healthy weight and improve your blood pressure and cholesterol levels  Ask your healthcare provider about the best exercise plan for you  Do not start an exercise program without asking your healthcare provider  Follow up with your healthcare provider as directed:  Write down your questions so you remember to ask them during your visits  © 2017 [employer]  Information is for End User's use only and may not be sold, redistributed or otherwise used for commercial purposes   All illustrations and images included in CareNotes® are the copyrighted property of [employer]  or [employer]  The above information is an  only  It is not intended as medical advice for individual conditions or treatments  Talk to your doctor, nurse or pharmacist before following any medical regimen to see if it is safe and effective for you

## 2020-08-20 NOTE — PROGRESS NOTES
Assessment/Plan:    Dry Skin of Feet  - Advised patient to moisturize her feet twice daily     - Advised to purchase a pumice stone to help in removing the dead skin  Obesity  - Patient is working on to lose weight  Counseled patient about healthy diet and routine exercise  Will continue to monitor  Depression screen  - Depression Screening Follow-up Plan: Patient's depression screening was positive with a PHQ-2 score of 0  Their PHQ-9 score was not indicated  Clinically patient does not have depression  No treatment is required  Diagnoses and all orders for this visit:    Encounter for screening mammogram for malignant neoplasm of breast  -     Mammo screening bilateral w cad; Future    Dry skin  -     ammonium lactate (LAC-HYDRIN) 12 % cream; Apply topically as needed for dry skin    Healthcare maintenance  -     CBC and differential; Future  -     Comprehensive metabolic panel; Future    Class 1 obesity without serious comorbidity with body mass index (BMI) of 30 0 to 30 9 in adult, unspecified obesity type    Encounter for screening for HIV  -     Human Immunodeficiency Virus 1/2 Antigen / Antibody ( Fourth Generation) with Reflex Testing; Future    Depression screen      All of patients questions were answered  Patient understands and agrees with the above plan  Return in about 2 months (around 10/20/2020) for Annual physical; weight  Jian Chambers PA-C  08/20/20  FP [facility]           Subjective:     Patient ID: Cristine Green  is a 35 y o  female who presents today in office for dry skin of feet  - Patient is a 35 y o  female who presents today for dry skin of feet  Patient notes for a long time now she has been experiencing dry skin of bilateral feet  Patient denies any pain, burning sensation, or discomfort of the feet  Patient denies any redness or rash  Patient notes she does wear socks mostly all the time    Patient notes she has been applying Vaseline with little relief  - Patient notes she would like to lose weight  Specifically, patient notes she would like to lose some weight from her midsection  Patient notes she does like eating vegetables and does drink a lot of water  Patient notes she has been trying to perform sit-ups every day, but denies performing any other types of exercising  Patient notes lately she has been eating a lot of ice cream and pie which may be why she gained some weight  The following portions of the patient's history were reviewed and updated as appropriate: allergies, current medications, past family history, past medical history, past social history, past surgical history and problem list         Review of Systems   Constitutional: Negative for chills, fatigue and fever  HENT: Negative for congestion and sore throat  Eyes: Negative for pain  Respiratory: Negative for cough, chest tightness and shortness of breath  Cardiovascular: Negative for chest pain and palpitations  Gastrointestinal: Negative for abdominal pain, constipation, diarrhea, nausea and vomiting  Genitourinary: Negative for difficulty urinating and dysuria  Musculoskeletal: Negative for arthralgias  Skin: Negative for rash  Dry skin of bilateral feet   Neurological: Negative for dizziness and headaches  Psychiatric/Behavioral: The patient is not nervous/anxious  BMI Counseling: Body mass index is 30 34 kg/m²  The BMI is above normal  Nutrition recommendations include decreasing portion sizes, encouraging healthy choices of fruits and vegetables, decreasing fast food intake, consuming healthier snacks, limiting drinks that contain sugar, moderation in carbohydrate intake and increasing intake of lean protein  Exercise recommendations include moderate physical activity 150 minutes/week  No pharmacotherapy was ordered               Objective:   Vitals:    08/20/20 1149   BP: 122/78   BP Location: Right arm   Patient Position: Sitting Cuff Size: Standard   Pulse: 80   Resp: 18   Temp: 97 7 °F (36 5 °C)   SpO2: 98%   Weight: 78 9 kg (174 lb)         Physical Exam  Vitals signs and nursing note reviewed  Constitutional:       Appearance: She is well-developed  HENT:      Head: Normocephalic and atraumatic  Right Ear: External ear normal       Left Ear: External ear normal       Nose: Nose normal       Mouth/Throat:      Pharynx: Uvula midline  Eyes:      Conjunctiva/sclera: Conjunctivae normal    Neck:      Musculoskeletal: Normal range of motion and neck supple  Cardiovascular:      Rate and Rhythm: Normal rate and regular rhythm  Heart sounds: Normal heart sounds  No murmur  Pulmonary:      Effort: Pulmonary effort is normal       Breath sounds: Normal breath sounds  No wheezing  Musculoskeletal: Normal range of motion  Skin:     General: Skin is warm and dry  Comments: Dry skin noted of bilateral feet  Neurological:      Mental Status: She is alert and oriented to person, place, and time     Psychiatric:         Speech: Speech normal          Behavior: Behavior normal            PHQ-9 Depression Screening    PHQ-9:    Frequency of the following problems over the past two weeks:       Little interest or pleasure in doing things:  0 - not at all  Feeling down, depressed, or hopeless:  0 - not at all  PHQ-2 Score:  0

## 2021-01-05 ENCOUNTER — HOSPITAL ENCOUNTER (EMERGENCY)
Facility: HOSPITAL | Age: 41
Discharge: HOME | End: 2021-01-05
Attending: EMERGENCY MEDICINE
Payer: COMMERCIAL

## 2021-01-05 ENCOUNTER — LAB (OUTPATIENT)
Dept: LAB | Facility: HOSPITAL | Age: 41
End: 2021-01-05
Payer: COMMERCIAL

## 2021-01-05 VITALS
TEMPERATURE: 98.2 F | OXYGEN SATURATION: 99 % | HEART RATE: 104 BPM | SYSTOLIC BLOOD PRESSURE: 155 MMHG | WEIGHT: 179.01 LBS | DIASTOLIC BLOOD PRESSURE: 88 MMHG | BODY MASS INDEX: 31.21 KG/M2 | RESPIRATION RATE: 20 BRPM

## 2021-01-05 DIAGNOSIS — Z11.4 ENCOUNTER FOR SCREENING FOR HIV: ICD-10-CM

## 2021-01-05 DIAGNOSIS — Z00.00 HEALTHCARE MAINTENANCE: ICD-10-CM

## 2021-01-05 DIAGNOSIS — T16.2XXA FOREIGN BODY OF LEFT EAR, INITIAL ENCOUNTER: Primary | ICD-10-CM

## 2021-01-05 LAB
ALBUMIN SERPL BCP-MCNC: 4.2 G/DL (ref 3.5–5)
ALP SERPL-CCNC: 75 U/L (ref 46–116)
ALT SERPL W P-5'-P-CCNC: 19 U/L (ref 12–78)
ANION GAP SERPL CALCULATED.3IONS-SCNC: 8 MMOL/L (ref 4–13)
AST SERPL W P-5'-P-CCNC: 11 U/L (ref 5–45)
BASOPHILS # BLD AUTO: 0.03 THOUSANDS/ΜL (ref 0–0.1)
BASOPHILS NFR BLD AUTO: 1 % (ref 0–1)
BILIRUB SERPL-MCNC: 0.5 MG/DL (ref 0.2–1)
BUN SERPL-MCNC: 10 MG/DL (ref 5–25)
CALCIUM SERPL-MCNC: 9.2 MG/DL (ref 8.3–10.1)
CHLORIDE SERPL-SCNC: 105 MMOL/L (ref 100–108)
CO2 SERPL-SCNC: 27 MMOL/L (ref 21–32)
CREAT SERPL-MCNC: 0.86 MG/DL (ref 0.6–1.3)
EOSINOPHIL # BLD AUTO: 0.11 THOUSAND/ΜL (ref 0–0.61)
EOSINOPHIL NFR BLD AUTO: 2 % (ref 0–6)
ERYTHROCYTE [DISTWIDTH] IN BLOOD BY AUTOMATED COUNT: 11.9 % (ref 11.6–15.1)
GFR SERPL CREATININE-BSD FRML MDRD: 97 ML/MIN/1.73SQ M
GLUCOSE P FAST SERPL-MCNC: 85 MG/DL (ref 65–99)
HCT VFR BLD AUTO: 40.7 % (ref 34.8–46.1)
HGB BLD-MCNC: 13.2 G/DL (ref 11.5–15.4)
IMM GRANULOCYTES # BLD AUTO: 0.02 THOUSAND/UL (ref 0–0.2)
IMM GRANULOCYTES NFR BLD AUTO: 0 % (ref 0–2)
LYMPHOCYTES # BLD AUTO: 1.66 THOUSANDS/ΜL (ref 0.6–4.47)
LYMPHOCYTES NFR BLD AUTO: 28 % (ref 14–44)
MCH RBC QN AUTO: 30.9 PG (ref 26.8–34.3)
MCHC RBC AUTO-ENTMCNC: 32.4 G/DL (ref 31.4–37.4)
MCV RBC AUTO: 95 FL (ref 82–98)
MONOCYTES # BLD AUTO: 0.44 THOUSAND/ΜL (ref 0.17–1.22)
MONOCYTES NFR BLD AUTO: 8 % (ref 4–12)
NEUTROPHILS # BLD AUTO: 3.64 THOUSANDS/ΜL (ref 1.85–7.62)
NEUTS SEG NFR BLD AUTO: 61 % (ref 43–75)
NRBC BLD AUTO-RTO: 0 /100 WBCS
PLATELET # BLD AUTO: 327 THOUSANDS/UL (ref 149–390)
PMV BLD AUTO: 10.5 FL (ref 8.9–12.7)
POTASSIUM SERPL-SCNC: 3.8 MMOL/L (ref 3.5–5.3)
PROT SERPL-MCNC: 8.6 G/DL (ref 6.4–8.2)
RBC # BLD AUTO: 4.27 MILLION/UL (ref 3.81–5.12)
SODIUM SERPL-SCNC: 140 MMOL/L (ref 136–145)
WBC # BLD AUTO: 5.9 THOUSAND/UL (ref 4.31–10.16)

## 2021-01-05 PROCEDURE — 85025 COMPLETE CBC W/AUTO DIFF WBC: CPT

## 2021-01-05 PROCEDURE — 80053 COMPREHEN METABOLIC PANEL: CPT

## 2021-01-05 PROCEDURE — 69200 CLEAR OUTER EAR CANAL: CPT | Performed by: PHYSICIAN ASSISTANT

## 2021-01-05 PROCEDURE — 99282 EMERGENCY DEPT VISIT SF MDM: CPT | Performed by: PHYSICIAN ASSISTANT

## 2021-01-05 PROCEDURE — 87389 HIV-1 AG W/HIV-1&-2 AB AG IA: CPT

## 2021-01-05 PROCEDURE — 36415 COLL VENOUS BLD VENIPUNCTURE: CPT

## 2021-01-05 PROCEDURE — 99282 EMERGENCY DEPT VISIT SF MDM: CPT

## 2021-01-05 NOTE — ED PROVIDER NOTES
History  Chief Complaint   Patient presents with    Foreign Body in [address] report cotton from Q-tip in left ear;      Patient is a 39 y/o female with a PMH of depression who presents for evaluation of foreign body to the left ear  She states she was cleaning her ear this morning with a qtip when the cotton tip came off in her ear  She tried to get it out at home but was unsuccessful  She complains of some mild pain to the ear and diminished hearing  She denies other fever, chills, nausea, vomiting, diarrhea, chest pain, shortness of breath, abdominal pain, ear drainage or bleeding, URI symptoms  None       Past Medical History:   Diagnosis Date    Depression        Past Surgical History:   Procedure Laterality Date     SECTION  2016    OTHER SURGICAL HISTORY  2013    removed boil under arm       Family History   Problem Relation Age of Onset    Hypertension Family     Cancer Neg Hx      I have reviewed and agree with the history as documented  E-Cigarette/Vaping     E-Cigarette/Vaping Substances     Social History     Tobacco Use    Smoking status: Never Smoker    Smokeless tobacco: Never Used   Substance Use Topics    Alcohol use: No    Drug use: No       Review of Systems   Constitutional: Negative for chills and fever  HENT: Positive for ear pain (FB to left ear )  Negative for congestion, ear discharge, rhinorrhea and sore throat  Respiratory: Negative for cough and shortness of breath  Cardiovascular: Negative for chest pain  Gastrointestinal: Negative for abdominal pain, diarrhea, nausea and vomiting  Skin: Negative for rash  All other systems reviewed and are negative  Physical Exam  Physical Exam  Vitals signs and nursing note reviewed  Constitutional:       General: She is not in acute distress  Appearance: Normal appearance  She is normal weight  She is not ill-appearing, toxic-appearing or diaphoretic     HENT:      Head: Normocephalic and atraumatic  Right Ear: Tympanic membrane, ear canal and external ear normal       Left Ear: A foreign body (cotton piece of Qtip in canal) is present  Nose: Nose normal    Cardiovascular:      Rate and Rhythm: Normal rate and regular rhythm  Heart sounds: Normal heart sounds  No murmur  Pulmonary:      Effort: Pulmonary effort is normal  No respiratory distress  Breath sounds: Normal breath sounds  No stridor  No wheezing or rhonchi  Skin:     General: Skin is warm and dry  Neurological:      Mental Status: She is alert     Psychiatric:         Mood and Affect: Mood normal          Vital Signs  ED Triage Vitals   Temperature Pulse Respirations Blood Pressure SpO2   01/05/21 1024 01/05/21 1024 01/05/21 1024 01/05/21 1025 01/05/21 1024   98 2 °F (36 8 °C) 104 20 155/88 99 %      Temp Source Heart Rate Source Patient Position - Orthostatic VS BP Location FiO2 (%)   01/05/21 1024 -- -- -- --   Temporal          Pain Score       --                  Vitals:    01/05/21 1024 01/05/21 1025   BP:  155/88   Pulse: 104          Visual Acuity      ED Medications  Medications - No data to display    Diagnostic Studies  Results Reviewed     None                 No orders to display              Procedures  Foreign Body - Orifice    Date/Time: 1/5/2021 11:00 AM  Performed by: Lee Ann Ardon PA-C  Authorized by: Lee Ann Ardon PA-C     Patient location:  ED  Other Assisting Provider: Yes (comment) (MARA)    Consent:     Consent obtained:  Verbal    Consent given by:  Patient    Risks discussed:  TM perforation, incomplete removal, infection, bleeding, pain and damage to surrounding structures    Alternatives discussed:  No treatment and referral  Universal protocol:     Procedure explained and questions answered to patient or proxy's satisfaction: yes      Patient identity confirmed:  Verbally with patient  Location:     Location:  Ear    Ear location:  L ear  Pre-procedure details:     Imaging: None  Anesthesia (see MAR for exact dosages): Topical anesthetic:  None  Procedure details:     Localization method:  Direct visualization    Removal mechanism: Forceps    Procedure complexity:  Simple    Foreign bodies recovered:  1    Description:  Cotton qtip    Intact foreign body removal: yes    Post-procedure details:     Confirmation:  No additional foreign bodies on visualization    Patient tolerance of procedure: Tolerated well, no immediate complications             ED Course                             SBIRT 22yo+      Most Recent Value   SBIRT (22 yo +)   In order to provide better care to our patients, we are screening all of our patients for alcohol and drug use  Would it be okay to ask you these screening questions? Yes Filed at: 01/05/2021 1040   Initial Alcohol Screen: US AUDIT-C    1  How often do you have a drink containing alcohol?  0 Filed at: 01/05/2021 1040   2  How many drinks containing alcohol do you have on a typical day you are drinking? 0 Filed at: 01/05/2021 1040   3b  FEMALE Any Age, or MALE 65+: How often do you have 4 or more drinks on one occassion? 0 Filed at: 01/05/2021 1040   Audit-C Score  0 Filed at: 01/05/2021 1040   MATILDA: How many times in the past year have you    Used an illegal drug or used a prescription medication for non-medical reasons? Never Filed at: 01/05/2021 1040                    MDM  Number of Diagnoses or Management Options  Foreign body of left ear, initial encounter:   Diagnosis management comments: Cotton qtip successfully removed from left ear canal without complications  Discussed follow up with PCP as needed  Return to the ED if symptoms worsen or new symptoms arise  Patient states understanding and agrees with plan       Patient Progress  Patient progress: stable      Disposition  Final diagnoses:   Foreign body of left ear, initial encounter     Time reflects when diagnosis was documented in both MDM as applicable and the Disposition within this note     Time User Action Codes Description Comment    1/5/2021 11:13 AM Chidi Waterman Add [T16  2XXA] Foreign body of left ear, initial encounter       ED Disposition     ED Disposition Condition Date/Time Comment    Discharge Stable Tue Jan 5, 2021 11:13 AM Haley Cleveland discharge to home/self care  Follow-up Information     Follow up With Specialties Details Why Contact Info Additional Information    [facility] Schedule an appointment as soon as possible for a visit in 1 day As needed [id number] [address] [phone]       [address] [facility] Emergency Department Emergency Medicine  If symptoms worsen [facility] [id number]  [phone] [place] ED, [address] [facility] , [place], [place], [address]          There are no discharge medications for this patient  No discharge procedures on file      PDMP Review     None          ED Provider  Electronically Signed by           Marlin Morrison PA-C  01/05/21 2973

## 2021-01-06 LAB — HIV 1+2 AB+HIV1 P24 AG SERPL QL IA: NORMAL

## 2021-05-27 ENCOUNTER — OFFICE VISIT (OUTPATIENT)
Dept: FAMILY MEDICINE CLINIC | Facility: CLINIC | Age: 41
End: 2021-05-27

## 2021-05-27 VITALS
RESPIRATION RATE: 16 BRPM | HEART RATE: 104 BPM | DIASTOLIC BLOOD PRESSURE: 80 MMHG | SYSTOLIC BLOOD PRESSURE: 110 MMHG | BODY MASS INDEX: 28.12 KG/M2 | WEIGHT: 175 LBS | HEIGHT: 66 IN | OXYGEN SATURATION: 95 % | TEMPERATURE: 97.6 F

## 2021-05-27 DIAGNOSIS — J30.1 SEASONAL ALLERGIC RHINITIS DUE TO POLLEN: ICD-10-CM

## 2021-05-27 DIAGNOSIS — Z00.00 ANNUAL PHYSICAL EXAM: Primary | ICD-10-CM

## 2021-05-27 DIAGNOSIS — E66.3 OVERWEIGHT: ICD-10-CM

## 2021-05-27 DIAGNOSIS — F43.21 GRIEF: ICD-10-CM

## 2021-05-27 DIAGNOSIS — Z12.31 BREAST CANCER SCREENING BY MAMMOGRAM: ICD-10-CM

## 2021-05-27 PROCEDURE — 99396 PREV VISIT EST AGE 40-64: CPT | Performed by: PHYSICIAN ASSISTANT

## 2021-05-27 PROCEDURE — 1036F TOBACCO NON-USER: CPT | Performed by: PHYSICIAN ASSISTANT

## 2021-05-27 PROCEDURE — 3008F BODY MASS INDEX DOCD: CPT | Performed by: PHYSICIAN ASSISTANT

## 2021-05-27 RX ORDER — LORATADINE 10 MG/1
10 TABLET ORAL DAILY
Qty: 90 TABLET | Refills: 1 | Status: SHIPPED | OUTPATIENT
Start: 2021-05-27 | End: 2022-03-16

## 2021-05-27 RX ORDER — FLUTICASONE PROPIONATE 50 MCG
1 SPRAY, SUSPENSION (ML) NASAL DAILY
Qty: 16 G | Refills: 2 | Status: SHIPPED | OUTPATIENT
Start: 2021-05-27 | End: 2021-10-13 | Stop reason: SDUPTHER

## 2021-05-27 NOTE — PATIENT INSTRUCTIONS
- Please call to schedule weight management appointment at [phone]  Wellness Visit for Adults   AMBULATORY CARE:   A wellness visit  is when you see your healthcare provider to get screened for health problems  Your healthcare provider will also give you advice on how to stay healthy  Write down your questions so you remember to ask them  Ask your healthcare provider how often you should have a wellness visit  What happens at a wellness visit:  Your healthcare provider will ask about your health, and your family history of health problems  This includes high blood pressure, heart disease, and cancer  He or she will ask if you have symptoms that concern you, if you smoke, and about your mood  You may also be asked about your intake of medicines, supplements, food, and alcohol  Any of the following may be done:  · Your weight  will be checked  Your height may also be checked so your body mass index (BMI) can be calculated  Your BMI shows if you are at a healthy weight  · Your blood pressure  and heart rate will be checked  Your temperature may also be checked  · Blood and urine tests  may be done  Blood tests may be done to check your cholesterol levels  Abnormal cholesterol levels increase your risk for heart disease and stroke  You may also need a blood or urine test to check for diabetes if you are at increased risk  Urine tests may be done to look for signs of an infection or kidney disease  · A physical exam  includes checking your heartbeat and lungs with a stethoscope  Your healthcare provider may also check your skin to look for sun damage  · Screening tests  may be recommended  A screening test is done to check for diseases that may not cause symptoms  The screening tests you may need depend on your age, gender, family history, and lifestyle habits  For example, colorectal screening may be recommended if you are 48years old or older      Screening tests you need if you are a woman: · A Pap smear  is used to screen for cervical cancer  Pap smears are usually done every 3 to 5 years depending on your age  You may need them more often if you have had abnormal Pap smear test results in the past  Ask your healthcare provider how often you should have a Pap smear  · A mammogram  is an x-ray of your breasts to screen for breast cancer  Experts recommend mammograms every 2 years starting at age 48 years  You may need a mammogram at age 52 years or younger if you have an increased risk for breast cancer  Talk to your healthcare provider about when you should start having mammograms and how often you need them  Vaccines you may need:   · Get an influenza vaccine  every year  The influenza vaccine protects you from the flu  Several types of viruses cause the flu  The viruses change over time, so new vaccines are made each year  · Get a tetanus-diphtheria (Td) booster vaccine  every 10 years  This vaccine protects you against tetanus and diphtheria  Tetanus is a severe infection that may cause painful muscle spasms and lockjaw  Diphtheria is a severe bacterial infection that causes a thick covering in the back of your mouth and throat  · Get a human papillomavirus (HPV) vaccine  if you are female and aged 23 to 32 or male 23 to 24 and never received it  This vaccine protects you from HPV infection  HPV is the most common infection spread by sexual contact  HPV may also cause vaginal, penile, and anal cancers  · Get a pneumococcal vaccine  if you are aged 72 years or older  The pneumococcal vaccine is an injection given to protect you from pneumococcal disease  Pneumococcal disease is an infection caused by pneumococcal bacteria  The infection may cause pneumonia, meningitis, or an ear infection  · Get a shingles vaccine  if you are 60 or older, even if you have had shingles before  The shingles vaccine is an injection to protect you from the varicella-zoster virus   This is the same virus that causes chickenpox  Shingles is a painful rash that develops in people who had chickenpox or have been exposed to the virus  How to eat healthy:  My Plate is a model for planning healthy meals  It shows the types and amounts of foods that should go on your plate  Fruits and vegetables make up about half of your plate, and grains and protein make up the other half  A serving of dairy is included on the side of your plate  The amount of calories and serving sizes you need depends on your age, gender, weight, and height  Examples of healthy foods are listed below:  · Eat a variety of vegetables  such as dark green, red, and orange vegetables  You can also include canned vegetables low in sodium (salt) and frozen vegetables without added butter or sauces  · Eat a variety of fresh fruits , canned fruit in 100% juice, frozen fruit, and dried fruit  · Include whole grains  At least half of the grains you eat should be whole grains  Examples include whole-wheat bread, wheat pasta, brown rice, and whole-grain cereals such as oatmeal     · Eat a variety of protein foods such as seafood (fish and shellfish), lean meat, and poultry without skin (turkey and chicken)  Examples of lean meats include pork leg, shoulder, or tenderloin, and beef round, sirloin, tenderloin, and extra lean ground beef  Other protein foods include eggs and egg substitutes, beans, peas, soy products, nuts, and seeds  · Choose low-fat dairy products such as skim or 1% milk or low-fat yogurt, cheese, and cottage cheese  · Limit unhealthy fats  such as butter, hard margarine, and shortening  Exercise:  Exercise at least 30 minutes per day on most days of the week  Some examples of exercise include walking, biking, dancing, and swimming  You can also fit in more physical activity by taking the stairs instead of the elevator or parking farther away from stores  Include muscle strengthening activities 2 days each week   Regular exercise provides many health benefits  It helps you manage your weight, and decreases your risk for type 2 diabetes, heart disease, stroke, and high blood pressure  Exercise can also help improve your mood  Ask your healthcare provider about the best exercise plan for you  General health and safety guidelines:   · Do not smoke  Nicotine and other chemicals in cigarettes and cigars can cause lung damage  Ask your healthcare provider for information if you currently smoke and need help to quit  E-cigarettes or smokeless tobacco still contain nicotine  Talk to your healthcare provider before you use these products  · Limit alcohol  A drink of alcohol is 12 ounces of beer, 5 ounces of wine, or 1½ ounces of liquor  · Lose weight, if needed  Being overweight increases your risk of certain health conditions  These include heart disease, high blood pressure, type 2 diabetes, and certain types of cancer  · Protect your skin  Do not sunbathe or use tanning beds  Use sunscreen with a SPF 15 or higher  Apply sunscreen at least 15 minutes before you go outside  Reapply sunscreen every 2 hours  Wear protective clothing, hats, and sunglasses when you are outside  · Drive safely  Always wear your seatbelt  Make sure everyone in your car wears a seatbelt  A seatbelt can save your life if you are in an accident  Do not use your cell phone when you are driving  This could distract you and cause an accident  Pull over if you need to make a call or send a text message  · Practice safe sex  Use latex condoms if are sexually active and have more than one partner  Your healthcare provider may recommend screening tests for sexually transmitted infections (STIs)  · Wear helmets, lifejackets, and protective gear  Always wear a helmet when you ride a bike or motorcycle, go skiing, or play sports that could cause a head injury  Wear protective equipment when you play sports   Wear a lifejacket when you are on a boat or doing water sports  © Copyright [address] Information is for End User's use only and may not be sold, redistributed or otherwise used for commercial purposes  All illustrations and images included in CareNotes® are the copyrighted property of [employer]  or Oneyda Patiño  The above information is an  only  It is not intended as medical advice for individual conditions or treatments  Talk to your doctor, nurse or pharmacist before following any medical regimen to see if it is safe and effective for you  Heart Healthy Diet   WHAT YOU NEED TO KNOW:   A heart healthy diet is an eating plan low in unhealthy fats and sodium (salt)  The plan is high in healthy fats and fiber  A heart healthy diet helps improve your cholesterol levels and lowers your risk for heart disease and stroke  A dietitian will teach you how to read and understand food labels  DISCHARGE INSTRUCTIONS:   Heart healthy diet guidelines to follow:   · Choose foods that contain healthy fats  ? Unsaturated fats  include monounsaturated and polyunsaturated fats  Unsaturated fat is found in foods such as soybean, canola, olive, corn, and safflower oils  It is also found in soft tub margarine that is made with liquid vegetable oil  ? Omega-3 fat  is found in certain fish, such as salmon, tuna, and trout, and in walnuts and flaxseed  Eat fish high in omega-3 fats at least 2 times a week  · Get 20 to 30 grams of fiber each day  Fruits, vegetables, whole-grain foods, and legumes (cooked beans) are good sources of fiber  · Limit or do not have unhealthy fats  ? Cholesterol  is found in animal foods, such as eggs and lobster, and in dairy products made from whole milk  Limit cholesterol to less than 200 mg each day  ? Saturated fat  is found in meats, such as desouza and hamburger  It is also found in chicken or turkey skin, whole milk, and butter   Limit saturated fat to less than 7% of your total daily calories  ? Trans fat  is found in packaged foods, such as potato chips and cookies  It is also in hard margarine, some fried foods, and shortening  Do not eat foods that contain trans fats  · Limit sodium as directed  You may be told to limit sodium to 2,000 to 2,300 mg each day  Choose low-sodium or no-salt-added foods  Add little or no salt to food you prepare  Use herbs and spices in place of salt  Include the following in your heart healthy plan:  Ask your dietitian or healthcare provider how many servings to have from each of the following food groups:  · Grains:      ? Whole-wheat breads, cereals, and pastas, and brown rice    ? Low-fat, low-sodium crackers and chips    · Vegetables:      ? Broccoli, green beans, green peas, and spinach    ? Collards, kale, and lima beans    ? Carrots, sweet potatoes, tomatoes, and peppers    ? Canned vegetables with no salt added    · Fruits:      ? Bananas, peaches, pears, and pineapple    ? Grapes, raisins, and dates    ? Oranges, tangerines, grapefruit, orange juice, and grapefruit juice    ? Apricots, mangoes, melons, and papaya    ? Raspberries and strawberries    ? Canned fruit with no added sugar    · Low-fat dairy:      ? Nonfat (skim) milk, 1% milk, and low-fat almond, cashew, or soy milks fortified with calcium    ? Low-fat cheese, regular or frozen yogurt, and cottage cheese    · Meats and proteins:      ? Lean cuts of beef and pork (loin, leg, round), skinless chicken and turkey    ? Legumes, soy products, egg whites, or nuts    Limit or do not include the following in your heart healthy plan:   · Unhealthy fats and oils:      ? Whole or 2% milk, cream cheese, sour cream, or cheese    ? High-fat cuts of beef (T-bone steaks, ribs), chicken or turkey with skin, and organ meats such as liver    ?  Butter, stick margarine, shortening, and cooking oils such as coconut or palm oil    · Foods and liquids high in sodium:      ? Packaged foods, such as frozen dinners, cookies, macaroni and cheese, and cereals with more than 300 mg of sodium per serving    ? Vegetables with added sodium, such as instant potatoes, vegetables with added sauces, or regular canned vegetables    ? Cured or smoked meats, such as hot dogs, desouza, and sausage    ? High-sodium ketchup, barbecue sauce, salad dressing, pickles, olives, soy sauce, or miso    · Foods and liquids high in sugar:      ? Candy, cake, cookies, pies, or doughnuts    ? Soft drinks (soda), sports drinks, or sweetened tea    ? Canned or dry mixes for cakes, soups, sauces, or gravies    Other healthy heart guidelines:   · Do not smoke  Nicotine and other chemicals in cigarettes and cigars can cause lung and heart damage  Ask your healthcare provider for information if you currently smoke and need help to quit  E-cigarettes or smokeless tobacco still contain nicotine  Talk to your healthcare provider before you use these products  · Limit or do not drink alcohol as directed  Alcohol can damage your heart and raise your blood pressure  Your healthcare provider may give you specific daily and weekly limits  The general recommended limit is 1 drink a day for women 21 or older and for men 72 or older  Do not have more than 3 drinks in a day or 7 in a week  The recommended limit is 2 drinks a day for men 24to 59years of age  Do not have more than 4 drinks in a day or 14 in a week  A drink of alcohol is 12 ounces of beer, 5 ounces of wine, or 1½ ounces of liquor  · Exercise regularly  Exercise can help you maintain a healthy weight and improve your blood pressure and cholesterol levels  Regular exercise can also decrease your risk for heart problems  Ask your healthcare provider about the best exercise plan for you  Do not start an exercise program without asking your healthcare provider         Follow up with your doctor or cardiologist as directed:  Write down your questions so you remember to ask them during your visits  © Copyright [address] Information is for End User's use only and may not be sold, redistributed or otherwise used for commercial purposes  All illustrations and images included in CareNotes® are the copyrighted property of [employer]  or Oneyda Patiño  The above information is an  only  It is not intended as medical advice for individual conditions or treatments  Talk to your doctor, nurse or pharmacist before following any medical regimen to see if it is safe and effective for you  Weight Management   WHAT YOU NEED TO KNOW:   Being overweight increases your risk of health conditions such as heart disease, high blood pressure, type 2 diabetes, and certain types of cancer  It can also increase your risk for osteoarthritis, sleep apnea, and other respiratory problems  Aim for a slow, steady weight loss  Even a small amount of weight loss can lower your risk of health problems  DISCHARGE INSTRUCTIONS:   How to lose weight safely:  A safe and healthy way to lose weight is to eat fewer calories and get regular exercise  · You can lose up about 1 pound a week by decreasing the number of calories you eat by 500 calories each day  You can decrease calories by eating smaller portion sizes or by cutting out high-calorie foods  Read labels to find out how many calories are in the foods you eat  · You can also burn calories with exercise such as walking, swimming, or biking  You will be more likely to keep weight off if you make these changes part of your lifestyle  Exercise at least 30 minutes per day on most days of the week  You can also fit in more physical activity by taking the stairs instead of the elevator or parking farther away from stores  Ask your healthcare provider about the best exercise plan for you         Healthy meal plan for weight management:  A healthy meal plan includes a variety of foods, contains fewer calories, and helps you stay healthy  A healthy meal plan includes the following:     · Eat whole-grain foods more often  A healthy meal plan should contain fiber  Fiber is the part of grains, fruits, and vegetables that is not broken down by your body  Whole-grain foods are healthy and provide extra fiber in your diet  Some examples of whole-grain foods are whole-wheat breads and pastas, oatmeal, brown rice, and bulgur  · Eat a variety of vegetables every day  Include dark, leafy greens such as spinach, kale, yokasta greens, and mustard greens  Eat yellow and orange vegetables such as carrots, sweet potatoes, and winter squash  · Eat a variety of fruits every day  Choose fresh or canned fruit (canned in its own juice or light syrup) instead of juice  Fruit juice has very little or no fiber  · Eat low-fat dairy foods  Drink fat-free (skim) milk or 1% milk  Eat fat-free yogurt and low-fat cottage cheese  Try low-fat cheeses such as mozzarella and other reduced-fat cheeses  · Choose meat and other protein foods that are low in fat  Choose beans or other legumes such as split peas or lentils  Choose fish, skinless poultry (chicken or turkey), or lean cuts of red meat (beef or pork)  Before you cook meat or poultry, cut off any visible fat  · Use less fat and oil  Try baking foods instead of frying them  Add less fat, such as margarine, sour cream, regular salad dressing, and mayonnaise to foods  Eat fewer high-fat foods  Some examples of high-fat foods include french fries, doughnuts, ice cream, and cakes  · Eat fewer sweets  Limit foods and drinks that are high in sugar  This includes candy, cookies, regular soda, and sweetened drinks  Ways to decrease calories:   · Eat smaller portions  ? Use a small plate with smaller servings  ? Do not eat second helpings  ? When you eat at a restaurant, ask for a box and place half of your meal in the box before you eat  ?  Share an entrée with someone else     · Replace high-calorie snacks with healthy, low-calorie snacks  ? Choose fresh fruit, vegetables, fat-free rice cakes, or air-popped popcorn instead of potato chips, nuts, or chocolate  ? Choose water or calorie-free drinks instead of soda or sweetened drinks  · Do not shop for groceries when you are hungry  You may be more likely to make unhealthy food choices  Take a grocery list of healthy foods and shop after you have eaten  · Eat regular meals  Do not skip meals  Skipping meals can lead to overeating later in the day  This can make it harder for you to lose weight  Eat a healthy snack in place of a meal if you do not have time to eat a regular meal  Talk with a dietitian to help you create a meal plan and schedule that is right for you  Other things to consider as you try to lose weight:   · Be aware of situations that may give you the urge to overeat, such as eating while watching television  Find ways to avoid these situations  For example, read a book, go for a walk, or do crafts  · Meet with a weight loss support group or friends who are also trying to lose weight  This may help you stay motivated to continue working on your weight loss goals  © Copyright [address] Information is for End User's use only and may not be sold, redistributed or otherwise used for commercial purposes  All illustrations and images included in CareNotes® are the copyrighted property of [employer] M , Inc  or [address] [place], [place] [address]sonal jennifer   The above information is an  only  It is not intended as medical advice for individual conditions or treatments  Talk to your doctor, nurse or pharmacist before following any medical regimen to see if it is safe and effective for you  Allergic Rhinitis   WHAT YOU NEED TO KNOW:   Allergic rhinitis, or hay fever, is swelling of the inside of your nose  The swelling is a reaction to allergens in the air   An allergen can be anything that causes an allergic reaction  Allergies to weeds, grass, trees, or mold often cause seasonal allergic rhinitis  Indoor dust mites, cockroaches, pet dander, or mold can also cause allergic rhinitis  DISCHARGE INSTRUCTIONS:   Call 911 for the following:   · You have chest pain or shortness of breath  Return to the emergency department if:   · You have severe pain  · You cough up blood  Contact your healthcare provider if:   · You have a fever  · You have ear or sinus pain, or a headache  · Your symptoms get worse, even after treatment  · You have yellow, green, brown, or bloody mucus coming from your nose  · Your nose is bleeding or you have pain inside your nose  · You have trouble sleeping because of your symptoms  · You have questions or concerns about your condition or care  Medicines:   · Medicines  help decrease your symptoms and clear your stuffy nose  · Take your medicine as directed  Contact your healthcare provider if you think your medicine is not helping or if you have side effects  Tell him of her if you are allergic to any medicine  Keep a list of the medicines, vitamins, and herbs you take  Include the amounts, and when and why you take them  Bring the list or the pill bottles to follow-up visits  Carry your medicine list with you in case of an emergency  How to manage allergic rhinitis:  The best way to manage allergic rhinitis is to avoid allergens that can trigger your symptoms  Any of the following may help decrease your symptoms:  · Rinse your nose and sinuses  with a salt water solution or use a salt water nasal spray  This will help thin the mucus in your nose and rinse away pollen and dirt  It will also help reduce swelling so you can breathe normally  Ask your healthcare provider how often to rinse your nose  · Reduce exposure to dust mites  Wash sheets and towels in hot water every week  Cover your pillows and mattresses with allergen-free covers   Limit the number of stuffed animals and soft toys your child has  Wash your child's toys in hot water regularly  Vacuum weekly and use a vacuum  with an air filter  If possible, get rid of carpets and curtains  These collect dust and dust mites  · Reduce exposure to pollen  Keep windows and doors closed in your house and car  Stay inside when air pollution or the pollen count is high  Run your air conditioner on recycle, and change air filters often  Shower and wash your hair before bed every night to rinse away pollen  · Reduce exposure to pet dander  If possible, do not keep cats, dogs, birds, or other pets  If you do keep pets in your home, keep them out of bedrooms and carpeted rooms  Bathe them often  · Reduce exposure to mold  Do not spend time in basements  Choose artificial plants instead of live plants  Keep your home's humidity at less than 45%  Do not have ponds or standing water in your home or yard  · Do not smoke  Avoid others who smoke  Ask your healthcare provider for information if you currently smoke and need help to quit  Follow up with your healthcare provider as directed: You may need to see an allergist often to control your symptoms  Write down your questions so you remember to ask them during your visits  © Copyright [address] Information is for End User's use only and may not be sold, redistributed or otherwise used for commercial purposes  All illustrations and images included in CareNotes® are the copyrighted property of [employer]  or [facility] Carito Fraser   The above information is an  only  It is not intended as medical advice for individual conditions or treatments  Talk to your doctor, nurse or pharmacist before following any medical regimen to see if it is safe and effective for you

## 2021-05-27 NOTE — PROGRESS NOTES
[address] [facility]    NAME: Sydna Blizzard  AGE: 38 y o  SEX: female  : 1980     DATE: 2021     Assessment and Plan:     Problem List Items Addressed This Visit        Respiratory    Seasonal allergic rhinitis due to pollen     - Will prescribe Claritin 10 mg daily and Flonase nasal spray daily  Relevant Medications    loratadine (Claritin) 10 mg tablet    fluticasone (FLONASE) 50 mcg/act nasal spray      Other Visit Diagnoses     Annual physical exam    -  Primary    Overweight        Relevant Orders    Ambulatory referral to Weight Management    Breast cancer screening by mammogram        Relevant Orders    Mammo screening bilateral w cad    Grief        Relevant Orders    Ambulatory referral to behavioral health therapists        Grief  - Patient is interested in resources to help with the grieving process  Will place referral to behavioral health therapy  Immunizations and preventive care screenings were discussed with patient today  Appropriate education was printed on patient's after visit summary  Counseling:  Alcohol/drug use: discussed moderation in alcohol intake, the recommendations for healthy alcohol use, and avoidance of illicit drug use  Dental Health: discussed importance of regular tooth brushing, flossing, and dental visits  Injury prevention: discussed safety/seat belts, safety helmets, smoke detectors, carbon dioxide detectors, and smoking near bedding or upholstery  Sexual health: discussed sexually transmitted diseases, partner selection, use of condoms, avoidance of unintended pregnancy, and contraceptive alternatives  · Exercise: the importance of regular exercise/physical activity was discussed  Recommend exercise 3-5 times per week for at least 30 minutes  BMI Counseling: Body mass index is 28 25 kg/m²   The BMI is above normal  Nutrition recommendations include decreasing portion sizes, encouraging healthy choices of fruits and vegetables, consuming healthier snacks, limiting drinks that contain sugar, moderation in carbohydrate intake, increasing intake of lean protein and reducing intake of cholesterol  Exercise recommendations include moderate physical activity 150 minutes/week  No pharmacotherapy was ordered  Patient referred to weight management due to patient being overweight  Return in about 1 year (around 5/27/2022) for Annual physical      Chief Complaint:     Chief Complaint   Patient presents with    Physical Exam    Well Check      History of Present Illness:     Adult Annual Physical   Patient here for a comprehensive physical exam      - Patient notes she lost a close friend recently and she is having a hard time dealing with it  Patient is interested in resources to help with the grieving process  Patient notes that was her neighbor and it is difficult for her to not see her every day  Also patient has been experiencing allergy symptoms including nasal congestion and itchy eyes  Diet and Physical Activity  · Diet/Nutrition: moderate  Patient would like to try diet  Patient notes her goal is to get down to about 150 or 160 lb  · Exercise: no formal exercise  General Health  · Sleep: moderate  · Hearing: normal - bilateral   · Vision: no vision problems  · Dental: regular dental visits  /GYN Health  · Last menstrual period: Earlier this month  · Last PAP in March 2019  Results were normal  HPV negative  Review of Systems:     Review of Systems   Constitutional: Negative for chills, fatigue and fever  HENT: Positive for congestion  Negative for sore throat  Eyes: Negative for pain  Respiratory: Negative for cough, chest tightness and shortness of breath  Cardiovascular: Negative for chest pain and palpitations  Gastrointestinal: Negative for abdominal pain, constipation, diarrhea, nausea and vomiting  Genitourinary: Negative for difficulty urinating and dysuria  Musculoskeletal: Negative for arthralgias  Skin: Negative for rash  Allergic/Immunologic: Positive for environmental allergies  Neurological: Negative for dizziness and headaches  Psychiatric/Behavioral: Negative for behavioral problems  The patient is not nervous/anxious           Grief over loss of close friend      Past Medical History:     Past Medical History:   Diagnosis Date    Depression       Past Surgical History:     Past Surgical History:   Procedure Laterality Date     SECTION  2016    OTHER SURGICAL HISTORY  2013    removed boil under arm      Social History:     Social History     Socioeconomic History    Marital status: Single     Spouse name: None    Number of children: None    Years of education: None    Highest education level: None   Occupational History    None   Social Needs    Financial resource strain: None    Food insecurity     Worry: None     Inability: None    Transportation needs     Medical: None     Non-medical: None   Tobacco Use    Smoking status: Never Smoker    Smokeless tobacco: Never Used   Substance and Sexual Activity    Alcohol use: No    Drug use: No    Sexual activity: Yes     Partners: Male     Birth control/protection: None   Lifestyle    Physical activity     Days per week: None     Minutes per session: None    Stress: None   Relationships    Social connections     Talks on phone: None     Gets together: None     Attends Yazidi service: None     Active member of club or organization: None     Attends meetings of clubs or organizations: None     Relationship status: None    Intimate partner violence     Fear of current or ex partner: None     Emotionally abused: None     Physically abused: None     Forced sexual activity: None   Other Topics Concern    None   Social History Narrative    None      Family History:     Family History   Problem Relation Age of Onset    Hypertension Family     Cancer Neg Hx       Current Medications:     Current Outpatient Medications   Medication Sig Dispense Refill    fluticasone (FLONASE) 50 mcg/act nasal spray 1 spray into each nostril daily 16 g 2    loratadine (Claritin) 10 mg tablet Take 1 tablet (10 mg total) by mouth daily 90 tablet 1     No current facility-administered medications for this visit  Allergies: Allergies   Allergen Reactions    No Active Allergies       Physical Exam:     /80 (BP Location: Left arm, Patient Position: Sitting, Cuff Size: Large)   Pulse 104   Temp 97 6 °F (36 4 °C) (Temporal)   Resp 16   Ht 5' 6" (1 676 m)   Wt 79 4 kg (175 lb)   SpO2 95%   BMI 28 25 kg/m²     Physical Exam  Vitals signs and nursing note reviewed  Constitutional:       Appearance: She is well-developed  HENT:      Head: Normocephalic and atraumatic  Right Ear: Tympanic membrane and external ear normal       Left Ear: Tympanic membrane and external ear normal       Nose: Nose normal       Mouth/Throat:      Pharynx: Uvula midline  Eyes:      Extraocular Movements: Extraocular movements intact  Conjunctiva/sclera: Conjunctivae normal       Pupils: Pupils are equal, round, and reactive to light  Neck:      Musculoskeletal: Normal range of motion and neck supple  Cardiovascular:      Rate and Rhythm: Normal rate and regular rhythm  Heart sounds: Normal heart sounds  No murmur  Pulmonary:      Effort: Pulmonary effort is normal       Breath sounds: Normal breath sounds  No wheezing  Abdominal:      General: Bowel sounds are normal       Palpations: Abdomen is soft  There is no mass  Tenderness: There is no abdominal tenderness  There is no guarding  Musculoskeletal: Normal range of motion  Skin:     General: Skin is warm and dry  Neurological:      Mental Status: She is alert and oriented to person, place, and time     Psychiatric:         Speech: Speech normal          Behavior: Behavior normal          JESSICA Gonzalez

## 2021-05-28 PROBLEM — J30.1 SEASONAL ALLERGIC RHINITIS DUE TO POLLEN: Status: ACTIVE | Noted: 2021-05-28

## 2021-06-04 ENCOUNTER — PATIENT OUTREACH (OUTPATIENT)
Dept: PEDIATRICS CLINIC | Facility: CLINIC | Age: 41
End: 2021-06-04

## 2021-06-16 ENCOUNTER — PATIENT OUTREACH (OUTPATIENT)
Dept: PEDIATRICS CLINIC | Facility: CLINIC | Age: 41
End: 2021-06-16

## 2021-06-16 NOTE — PROGRESS NOTES
Call placed to mother, Zachary Mchugh, at [phone]; VM box not set up and unable to speak to mother  Call placed to home number at [phone]  Cheri stated being sad/mad that she could not eat breakfast or drink coffee this morning  She clarified that her dental extraction is 6/17 at 10:30  Education provided that Zachary Mchugh IS allowed to eat and drink today but she cannot have anything after midnight or tomorrow morning prior to surgery  Cheri stated understanding  She was agreeable to CM outreaching next week for f/u after surgery and just as a reminder again for providing the Cyproheptadine

## 2021-10-13 ENCOUNTER — OFFICE VISIT (OUTPATIENT)
Dept: FAMILY MEDICINE CLINIC | Facility: CLINIC | Age: 41
End: 2021-10-13

## 2021-10-13 DIAGNOSIS — J30.1 SEASONAL ALLERGIC RHINITIS DUE TO POLLEN: ICD-10-CM

## 2021-10-13 DIAGNOSIS — R09.89 SYMPTOMS OF UPPER RESPIRATORY INFECTION (URI): Primary | ICD-10-CM

## 2021-10-13 PROCEDURE — 99213 OFFICE O/P EST LOW 20 MIN: CPT | Performed by: NURSE PRACTITIONER

## 2021-10-13 RX ORDER — FLUTICASONE PROPIONATE 50 MCG
1 SPRAY, SUSPENSION (ML) NASAL DAILY
Qty: 16 G | Refills: 2 | Status: SHIPPED | OUTPATIENT
Start: 2021-10-13 | End: 2022-03-16

## 2021-10-13 RX ORDER — GUAIFENESIN, PSEUDOEPHEDRINE HYDROCHLORIDE 600; 60 MG/1; MG/1
1 TABLET, EXTENDED RELEASE ORAL EVERY 12 HOURS
Qty: 30 TABLET | Refills: 0 | Status: SHIPPED | OUTPATIENT
Start: 2021-10-13 | End: 2021-11-10 | Stop reason: ALTCHOICE

## 2021-10-27 ENCOUNTER — TELEPHONE (OUTPATIENT)
Dept: FAMILY MEDICINE CLINIC | Facility: CLINIC | Age: 41
End: 2021-10-27

## 2021-10-27 NOTE — TELEPHONE ENCOUNTER
Pt called nurse line worried because she has not had her menstrual cycle this month yet  She is concerned that she is pregnant but has not had any sexual activity in months

## 2021-11-10 ENCOUNTER — OFFICE VISIT (OUTPATIENT)
Dept: FAMILY MEDICINE CLINIC | Facility: CLINIC | Age: 41
End: 2021-11-10

## 2021-11-10 VITALS
OXYGEN SATURATION: 99 % | RESPIRATION RATE: 16 BRPM | BODY MASS INDEX: 29.25 KG/M2 | HEART RATE: 85 BPM | SYSTOLIC BLOOD PRESSURE: 132 MMHG | DIASTOLIC BLOOD PRESSURE: 82 MMHG | HEIGHT: 66 IN | TEMPERATURE: 98.1 F | WEIGHT: 182 LBS

## 2021-11-10 DIAGNOSIS — J30.1 SEASONAL ALLERGIC RHINITIS DUE TO POLLEN: Primary | ICD-10-CM

## 2021-11-10 DIAGNOSIS — Z13.31 DEPRESSION SCREEN: ICD-10-CM

## 2021-11-10 DIAGNOSIS — N92.6 MISSED PERIOD: ICD-10-CM

## 2021-11-10 PROCEDURE — 99213 OFFICE O/P EST LOW 20 MIN: CPT | Performed by: PHYSICIAN ASSISTANT

## 2022-03-16 ENCOUNTER — OFFICE VISIT (OUTPATIENT)
Dept: OBGYN CLINIC | Facility: CLINIC | Age: 42
End: 2022-03-16

## 2022-03-16 VITALS
HEIGHT: 66 IN | WEIGHT: 169 LBS | BODY MASS INDEX: 27.16 KG/M2 | DIASTOLIC BLOOD PRESSURE: 79 MMHG | SYSTOLIC BLOOD PRESSURE: 121 MMHG

## 2022-03-16 DIAGNOSIS — N94.89 VULVAR BURNING: Primary | ICD-10-CM

## 2022-03-16 DIAGNOSIS — B37.3 VAGINAL CANDIDIASIS: ICD-10-CM

## 2022-03-16 PROBLEM — Z12.4 PAP SMEAR FOR CERVICAL CANCER SCREENING: Status: RESOLVED | Noted: 2019-03-25 | Resolved: 2022-03-16

## 2022-03-16 PROBLEM — Z11.3 ROUTINE SCREENING FOR STI (SEXUALLY TRANSMITTED INFECTION): Status: RESOLVED | Noted: 2019-03-25 | Resolved: 2022-03-16

## 2022-03-16 PROBLEM — Z01.419 ENCOUNTER FOR ANNUAL ROUTINE GYNECOLOGICAL EXAMINATION: Status: RESOLVED | Noted: 2019-03-25 | Resolved: 2022-03-16

## 2022-03-16 PROBLEM — L85.3 DRY SKIN: Status: RESOLVED | Noted: 2020-08-20 | Resolved: 2022-03-16

## 2022-03-16 PROBLEM — J30.1 SEASONAL ALLERGIC RHINITIS DUE TO POLLEN: Status: RESOLVED | Noted: 2021-05-28 | Resolved: 2022-03-16

## 2022-03-16 LAB
BV WHIFF TEST VAG QL: ABNORMAL
CLUE CELLS SPEC QL WET PREP: ABNORMAL
PH SMN: 4.5 [PH]
SL AMB LEUKOCYTE ESTERASE,UA: NORMAL
SL AMB POCT BLOOD,UA: NORMAL
SL AMB POCT CLARITY,UA: CLEAR
SL AMB POCT COLOR,UA: YELLOW
SL AMB POCT NITRITE,UA: NORMAL
SL AMB POCT WET MOUNT: ABNORMAL
T VAGINALIS VAG QL WET PREP: ABNORMAL
YEAST VAG QL WET PREP: ABNORMAL

## 2022-03-16 PROCEDURE — 99213 OFFICE O/P EST LOW 20 MIN: CPT | Performed by: NURSE PRACTITIONER

## 2022-03-16 PROCEDURE — 81002 URINALYSIS NONAUTO W/O SCOPE: CPT | Performed by: NURSE PRACTITIONER

## 2022-03-16 PROCEDURE — 87210 SMEAR WET MOUNT SALINE/INK: CPT | Performed by: NURSE PRACTITIONER

## 2022-03-16 RX ORDER — FLUCONAZOLE 150 MG/1
150 TABLET ORAL ONCE
Qty: 1 TABLET | Refills: 0 | Status: SHIPPED | OUTPATIENT
Start: 2022-03-16 | End: 2022-03-16

## 2022-03-16 NOTE — PATIENT INSTRUCTIONS
Thank you for your confidence in our team    We appreciate you and welcome your feedback  If you receive a survey from us, please take a few moments to let us know how we are doing     Sincerely,  JOB Felder

## 2022-03-16 NOTE — PROGRESS NOTES
PROBLEM GYNECOLOGICAL VISIT    Saintclair Croissant is a 42 y o  female who presents today with complaint of vulvar burning with urination  Her general medical history has been reviewed and she reports it as follows:    Past Medical History:   Diagnosis Date    Depression      Past Surgical History:   Procedure Laterality Date    AXILLARY SURGERY Right 2013     SECTION  2016     OB History        1    Para   1    Term   1       0    AB   0    Living   1       SAB   0    IAB   0    Ectopic   0    Multiple   0    Live Births   1               Social History     Tobacco Use    Smoking status: Never Smoker    Smokeless tobacco: Never Used   Vaping Use    Vaping Use: Never used   Substance Use Topics    Alcohol use: Yes     Comment: couple times/month    Drug use: Never       Current Medications:  denies    History of Present Illness:   Reports vulvar burning with urination for more than a month  She is not sure if she has vaginal discharge or not  Denies vaginal odor  States she has not been sexually active for more than a year  Denies pelvic pain  Reports menses are regular  Review of Systems:  Review of Systems   Constitutional: Negative  Gastrointestinal: Negative  Genitourinary: Negative for dysuria, menstrual problem, pelvic pain and vaginal discharge  Vulvar burning with urination       Physical Exam:  /79   Ht 5' 6" (1 676 m)   Wt 76 7 kg (169 lb)   LMP 2022 (LMP Unknown)   BMI 27 28 kg/m²   Physical Exam  Constitutional:       General: She is not in acute distress  Genitourinary:      Vulva exam comments: Erythema around vaginal introitus  Vaginal discharge, erythema and tenderness present  Neurological:      Mental Status: She is alert  Skin:     General: Skin is warm and dry  Vitals reviewed         Point of Care Testing:   -Wet mount: no clue cells, no trichomonads, rare WBC, pH=4 5   -KOH mount: no hyphae   -Whiff: negative   -urinalysis: negative leuks, negative nitrites, negative blood    Assessment:   1  Vaginal candidiasis  Plan:   1  Given Rx Diflucan     2  Return to office in 2 weeks for annual GYN exam

## 2022-04-11 ENCOUNTER — OFFICE VISIT (OUTPATIENT)
Dept: FAMILY MEDICINE CLINIC | Facility: CLINIC | Age: 42
End: 2022-04-11

## 2022-04-11 VITALS
TEMPERATURE: 99.1 F | DIASTOLIC BLOOD PRESSURE: 70 MMHG | SYSTOLIC BLOOD PRESSURE: 112 MMHG | BODY MASS INDEX: 27.34 KG/M2 | HEART RATE: 89 BPM | WEIGHT: 170.1 LBS | RESPIRATION RATE: 18 BRPM | HEIGHT: 66 IN | OXYGEN SATURATION: 98 %

## 2022-04-11 DIAGNOSIS — R30.0 DYSURIA: ICD-10-CM

## 2022-04-11 DIAGNOSIS — J30.1 SEASONAL ALLERGIC RHINITIS DUE TO POLLEN: Primary | ICD-10-CM

## 2022-04-11 DIAGNOSIS — E66.3 OVERWEIGHT: ICD-10-CM

## 2022-04-11 DIAGNOSIS — B37.3 VULVAR CANDIDIASIS: ICD-10-CM

## 2022-04-11 LAB
SL AMB  POCT GLUCOSE, UA: NORMAL
SL AMB LEUKOCYTE ESTERASE,UA: NEGATIVE
SL AMB POCT BILIRUBIN,UA: NEGATIVE
SL AMB POCT BLOOD,UA: NORMAL
SL AMB POCT CLARITY,UA: CLEAR
SL AMB POCT COLOR,UA: NORMAL
SL AMB POCT KETONES,UA: NEGATIVE
SL AMB POCT NITRITE,UA: NEGATIVE
SL AMB POCT PH,UA: 6
SL AMB POCT SPECIFIC GRAVITY,UA: 1.01
SL AMB POCT URINE PROTEIN: NORMAL
SL AMB POCT UROBILINOGEN: 8

## 2022-04-11 PROCEDURE — 81002 URINALYSIS NONAUTO W/O SCOPE: CPT | Performed by: PHYSICIAN ASSISTANT

## 2022-04-11 PROCEDURE — 99214 OFFICE O/P EST MOD 30 MIN: CPT | Performed by: PHYSICIAN ASSISTANT

## 2022-04-11 RX ORDER — CETIRIZINE HYDROCHLORIDE 10 MG/1
10 TABLET ORAL DAILY
Qty: 90 TABLET | Refills: 1 | Status: SHIPPED | OUTPATIENT
Start: 2022-04-11

## 2022-04-11 RX ORDER — FLUCONAZOLE 150 MG/1
150 TABLET ORAL ONCE
Qty: 2 TABLET | Refills: 0 | Status: SHIPPED | OUTPATIENT
Start: 2022-04-11 | End: 2022-04-11

## 2022-04-11 NOTE — PATIENT INSTRUCTIONS
Dr Ignacio Byrd  Address: [address] [place], [place], [address]    Phone: [phone]        Weight Management   WHAT YOU NEED TO KNOW:   Being overweight increases your risk of health conditions such as heart disease, high blood pressure, type 2 diabetes, and certain types of cancer  It can also increase your risk for osteoarthritis, sleep apnea, and other respiratory problems  Aim for a slow, steady weight loss  Even a small amount of weight loss can lower your risk of health problems  DISCHARGE INSTRUCTIONS:   How to lose weight safely:  A safe and healthy way to lose weight is to eat fewer calories and get regular exercise  · You can lose up about 1 pound a week by decreasing the number of calories you eat by 500 calories each day  You can decrease calories by eating smaller portion sizes or by cutting out high-calorie foods  Read labels to find out how many calories are in the foods you eat  · You can also burn calories with exercise such as walking, swimming, or biking  You will be more likely to keep weight off if you make these changes part of your lifestyle  Exercise at least 30 minutes per day on most days of the week  You can also fit in more physical activity by taking the stairs instead of the elevator or parking farther away from stores  Ask your healthcare provider about the best exercise plan for you  Healthy meal plan for weight management:  A healthy meal plan includes a variety of foods, contains fewer calories, and helps you stay healthy  A healthy meal plan includes the following:     · Eat whole-grain foods more often  A healthy meal plan should contain fiber  Fiber is the part of grains, fruits, and vegetables that is not broken down by your body  Whole-grain foods are healthy and provide extra fiber in your diet  Some examples of whole-grain foods are whole-wheat breads and pastas, oatmeal, brown rice, and bulgur  · Eat a variety of vegetables every day    Include dark, leafy greens such as spinach, kale, yokasta greens, and mustard greens  Eat yellow and orange vegetables such as carrots, sweet potatoes, and winter squash  · Eat a variety of fruits every day  Choose fresh or canned fruit (canned in its own juice or light syrup) instead of juice  Fruit juice has very little or no fiber  · Eat low-fat dairy foods  Drink fat-free (skim) milk or 1% milk  Eat fat-free yogurt and low-fat cottage cheese  Try low-fat cheeses such as mozzarella and other reduced-fat cheeses  · Choose meat and other protein foods that are low in fat  Choose beans or other legumes such as split peas or lentils  Choose fish, skinless poultry (chicken or turkey), or lean cuts of red meat (beef or pork)  Before you cook meat or poultry, cut off any visible fat  · Use less fat and oil  Try baking foods instead of frying them  Add less fat, such as margarine, sour cream, regular salad dressing, and mayonnaise to foods  Eat fewer high-fat foods  Some examples of high-fat foods include french fries, doughnuts, ice cream, and cakes  · Eat fewer sweets  Limit foods and drinks that are high in sugar  This includes candy, cookies, regular soda, and sweetened drinks  Ways to decrease calories:   · Eat smaller portions  ? Use a small plate with smaller servings  ? Do not eat second helpings  ? When you eat at a restaurant, ask for a box and place half of your meal in the box before you eat  ? Share an entrée with someone else  · Replace high-calorie snacks with healthy, low-calorie snacks  ? Choose fresh fruit, vegetables, fat-free rice cakes, or air-popped popcorn instead of potato chips, nuts, or chocolate  ? Choose water or calorie-free drinks instead of soda or sweetened drinks  · Do not shop for groceries when you are hungry  You may be more likely to make unhealthy food choices  Take a grocery list of healthy foods and shop after you have eaten      · Eat regular meals  Do not skip meals  Skipping meals can lead to overeating later in the day  This can make it harder for you to lose weight  Eat a healthy snack in place of a meal if you do not have time to eat a regular meal  Talk with a dietitian to help you create a meal plan and schedule that is right for you  Other things to consider as you try to lose weight:   · Be aware of situations that may give you the urge to overeat, such as eating while watching television  Find ways to avoid these situations  For example, read a book, go for a walk, or do crafts  · Meet with a weight loss support group or friends who are also trying to lose weight  This may help you stay motivated to continue working on your weight loss goals  © Copyright [employer] 2022 Information is for End User's use only and may not be sold, redistributed or otherwise used for commercial purposes  All illustrations and images included in CareNotes® are the copyrighted property of A D A M , Inc  or [facility] Carito Fraser   The above information is an  only  It is not intended as medical advice for individual conditions or treatments  Talk to your doctor, nurse or pharmacist before following any medical regimen to see if it is safe and effective for you

## 2022-04-11 NOTE — PROGRESS NOTES
Assessment/Plan:    Seasonal allergic rhinitis due to pollen  - Patient was unable to  Claritin or Flonase from the pharmacy because her insurance did not cover it  - Will send Zyrtec 10 mg daily  Overweight  - Patient is requesting referral to weight management  Will place referral today  Vulvar candidiasis/dysuria  - POCT urine dip is negative for infection  Patient was recently diagnosed with vaginal yeast infection and was prescribed Diflucan with relief of symptoms  Symptoms have now returned  Will treat prophylactically for yeast infection with Diflucan  - Advised patient to follow-up with OBGYN if her symptoms persist or worsen  Diagnoses and all orders for this visit:    Seasonal allergic rhinitis due to pollen  -     cetirizine (ZyrTEC) 10 mg tablet; Take 1 tablet (10 mg total) by mouth daily    Dysuria  -     POCT urine dip    Overweight  -     Ambulatory Referral to Weight Management; Future    Vulvar candidiasis  -     fluconazole (DIFLUCAN) 150 mg tablet; Take 1 tablet (150 mg total) by mouth once for 1 dose Take another dose in 3 days if symptoms persist           All of patients questions were answered  Patient understands and agrees with the above plan  Return in about 2 months (around 6/11/2022) for Annual physical     Devoria Bal  04/11/22  [address] [place]          Subjective:     Patient ID: Lexie Muniz  is a 42 y o  female with known PHM of seasonal allergies  who presents today in office for allergies follow up  - Patient is a 42 y o  female who presents today for allergies follow up  Patient notes her allergies have been bothering her  Patient notes currently she is not taking any medications because her insurance did not cover the medications that were previously sent  Also, patient notes she was seen at [facility] a few weeks ago and was diagnosed with yeast infection    Patient notes she was prescribed Diflucan which did help her symptoms, but now they have returned  Patient notes she is experiencing itchiness and burning sensation of vaginal area  - Also, patient notes she would like to lose weight  Patient notes she has been doing some research and is unsure which diet program to follow  Patient notes her goal weight is 150 lb  The following portions of the patient's history were reviewed and updated as appropriate: allergies, current medications, past family history, past medical history, past social history, past surgical history and problem list         Review of Systems   Constitutional: Negative for chills and fever  HENT: Positive for congestion  Negative for ear pain, rhinorrhea and sore throat  Eyes: Negative for pain  Respiratory: Negative for cough, chest tightness and shortness of breath  Cardiovascular: Negative for chest pain, palpitations and leg swelling  Gastrointestinal: Negative for abdominal pain, constipation, diarrhea, nausea and vomiting  Genitourinary: Positive for dysuria and vaginal pain  Negative for difficulty urinating  Musculoskeletal: Negative for arthralgias  Skin: Negative for rash  Allergic/Immunologic: Positive for environmental allergies  Neurological: Negative for dizziness and headaches  Psychiatric/Behavioral: The patient is not nervous/anxious  BMI Counseling: Body mass index is 27 45 kg/m²  The BMI is above normal  Nutrition recommendations include decreasing portion sizes, encouraging healthy choices of fruits and vegetables and consuming healthier snacks  Exercise recommendations include moderate physical activity 150 minutes/week  No pharmacotherapy was ordered  Patient referred to weight management  Rationale for BMI follow-up plan is due to patient being overweight or obese             Objective:   Vitals:    04/11/22 0839   BP: 112/70   BP Location: Right arm   Patient Position: Sitting   Cuff Size: Standard   Pulse: 89   Resp: 18   Temp: 99 1 °F (37 3 °C)   TempSrc: Temporal   SpO2: 98%   Weight: 77 2 kg (170 lb 1 6 oz)   Height: 5' 6" (1 676 m)         Physical Exam  Vitals and nursing note reviewed  Constitutional:       General: She is not in acute distress  Appearance: She is well-developed  HENT:      Head: Normocephalic and atraumatic  Right Ear: External ear normal       Left Ear: External ear normal       Nose: Nose normal    Eyes:      Conjunctiva/sclera: Conjunctivae normal    Cardiovascular:      Rate and Rhythm: Normal rate and regular rhythm  Pulses: Normal pulses  Heart sounds: Normal heart sounds  Pulmonary:      Effort: Pulmonary effort is normal  No respiratory distress  Breath sounds: Normal breath sounds  No wheezing  Musculoskeletal:      Cervical back: Normal range of motion and neck supple  Skin:     General: Skin is warm and dry  Neurological:      Mental Status: She is alert and oriented to person, place, and time     Psychiatric:         Behavior: Behavior normal

## 2022-04-12 PROBLEM — E66.3 OVERWEIGHT: Status: ACTIVE | Noted: 2022-04-12

## 2022-04-13 NOTE — ASSESSMENT & PLAN NOTE
- Patient was unable to  Claritin or Flonase from the pharmacy because her insurance did not cover it  - Will send Zyrtec 10 mg daily

## 2022-06-23 ENCOUNTER — OFFICE VISIT (OUTPATIENT)
Dept: FAMILY MEDICINE CLINIC | Facility: CLINIC | Age: 42
End: 2022-06-23

## 2022-06-23 VITALS
DIASTOLIC BLOOD PRESSURE: 80 MMHG | OXYGEN SATURATION: 98 % | RESPIRATION RATE: 19 BRPM | BODY MASS INDEX: 27 KG/M2 | WEIGHT: 168 LBS | TEMPERATURE: 97.4 F | SYSTOLIC BLOOD PRESSURE: 122 MMHG | HEART RATE: 82 BPM | HEIGHT: 66 IN

## 2022-06-23 DIAGNOSIS — Z00.00 ANNUAL PHYSICAL EXAM: Primary | ICD-10-CM

## 2022-06-23 DIAGNOSIS — J30.1 SEASONAL ALLERGIC RHINITIS DUE TO POLLEN: ICD-10-CM

## 2022-06-23 DIAGNOSIS — Z12.31 BREAST CANCER SCREENING BY MAMMOGRAM: ICD-10-CM

## 2022-06-23 DIAGNOSIS — Z13.31 DEPRESSION SCREEN: ICD-10-CM

## 2022-06-23 PROCEDURE — 99396 PREV VISIT EST AGE 40-64: CPT | Performed by: PHYSICIAN ASSISTANT

## 2022-06-23 NOTE — PATIENT INSTRUCTIONS
Ricardo - Please call to schedule an appointment  Thanks!    [phone]    - Please call [phone] to schedule mammogram  Thanks!

## 2022-06-23 NOTE — PROGRESS NOTES
[facility]    NAME: Warner Bernal  AGE: 42 y o  SEX: female  : 1980     DATE: 2022     Assessment and Plan:     Problem List Items Addressed This Visit        Respiratory    Seasonal allergic rhinitis due to pollen     - Per patient, she was unable to  Zyrtec from her pharmacy because her insurance did not cover it  Recommended for patient to try purchasing it over-the-counter  Other Visit Diagnoses     Annual physical exam    -  Primary    Breast cancer screening by mammogram        Relevant Orders    Mammo screening bilateral w 3d & cad    Depression screen              Immunizations and preventive care screenings were discussed with patient today  Appropriate education was printed on patient's after visit summary  Counseling:  Alcohol/drug use: discussed moderation in alcohol intake, the recommendations for healthy alcohol use, and avoidance of illicit drug use  Dental Health: discussed importance of regular tooth brushing, flossing, and dental visits  Injury prevention: discussed safety/seat belts, safety helmets, smoke detectors, carbon dioxide detectors, and smoking near bedding or upholstery  Sexual health: discussed sexually transmitted diseases, partner selection, use of condoms, avoidance of unintended pregnancy, and contraceptive alternatives  · Exercise: the importance of regular exercise/physical activity was discussed  Recommend exercise 3-5 times per week for at least 30 minutes  Return in about 3 months (around 2022) for Next scheduled follow up allergies  Chief Complaint:     Chief Complaint   Patient presents with    Physical Exam      History of Present Illness:     Adult Annual Physical   Patient here for a comprehensive physical exam  Patient notes she does continue with some vulvar burning and occasional vaginal discharge    Patient was seen by OBGYN in March and was prescribed Diflucan  Patient is unsure if the medication help  Recommended for patient to schedule follow-up  Diet and Physical Activity  · Diet/Nutrition: well balanced diet  · Exercise: moderate cardiovascular exercise  Depression Screening  PHQ-2/9 Depression Screening    Little interest or pleasure in doing things: 0 - not at all  Feeling down, depressed, or hopeless: 0 - not at all  PHQ-2 Score: 0  PHQ-2 Interpretation: Negative depression screen       General Health  · Sleep: sleeps well  · Hearing: normal - bilateral   · Vision: no vision problems  · Dental: regular dental visits  /GYN Health  · Last menstrual period: 2022     Review of Systems:     Review of Systems   Constitutional: Negative for chills and fever  HENT: Positive for congestion  Negative for ear pain, rhinorrhea and sore throat  Eyes: Negative for pain  Respiratory: Negative for cough, chest tightness and shortness of breath  Cardiovascular: Negative for chest pain, palpitations and leg swelling  Gastrointestinal: Negative for abdominal pain, constipation, diarrhea, nausea and vomiting  Genitourinary: Positive for dysuria and vaginal pain  Negative for difficulty urinating  Vulvar burning   Musculoskeletal: Negative for arthralgias  Skin: Negative for rash  Allergic/Immunologic: Positive for environmental allergies  Neurological: Negative for dizziness and headaches  Psychiatric/Behavioral: The patient is not nervous/anxious         Past Medical History:     Past Medical History:   Diagnosis Date    Depression       Past Surgical History:     Past Surgical History:   Procedure Laterality Date    AXILLARY SURGERY Right 2013     SECTION  2016      Social History:     Social History     Socioeconomic History    Marital status: Single     Spouse name: None    Number of children: None    Years of education: None    Highest education level: None Occupational History    None   Tobacco Use    Smoking status: Never Smoker    Smokeless tobacco: Never Used   Vaping Use    Vaping Use: Never used   Substance and Sexual Activity    Alcohol use: Yes     Comment: couple times/month    Drug use: Never    Sexual activity: Not Currently     Partners: Male     Birth control/protection: None   Other Topics Concern    None   Social History Narrative    None     Social Determinants of Health     Financial Resource Strain: Low Risk     Difficulty of Paying Living Expenses: Not very hard   Food Insecurity: No Food Insecurity    Worried About Running Out of Food in the Last Year: Never true    Vincent of Food in the Last Year: Never true   Transportation Needs: No Transportation Needs    Lack of Transportation (Medical): No    Lack of Transportation (Non-Medical): No   Physical Activity: Not on file   Stress: Not on file   Social Connections: Not on file   Intimate Partner Violence: Not on file   Housing Stability: Not on file      Family History:     Family History   Problem Relation Age of Onset    Hypertension Family     Cancer Neg Hx     Breast cancer Neg Hx     Colon cancer Neg Hx     Ovarian cancer Neg Hx       Current Medications:     Current Outpatient Medications   Medication Sig Dispense Refill    cetirizine (ZyrTEC) 10 mg tablet Take 1 tablet (10 mg total) by mouth daily 90 tablet 1     No current facility-administered medications for this visit  Allergies:     No Known Allergies   Physical Exam:     /80 (BP Location: Left arm, Patient Position: Sitting, Cuff Size: Adult)   Pulse 82   Temp (!) 97 4 °F (36 3 °C) (Temporal)   Resp 19   Ht 5' 6" (1 676 m)   Wt 76 2 kg (168 lb)   LMP 06/16/2022   SpO2 98%   BMI 27 12 kg/m²     Physical Exam  Vitals and nursing note reviewed  Constitutional:       General: She is not in acute distress  Appearance: She is well-developed  HENT:      Head: Normocephalic and atraumatic  Right Ear: External ear normal       Left Ear: External ear normal       Nose: Nose normal    Eyes:      Conjunctiva/sclera: Conjunctivae normal    Cardiovascular:      Rate and Rhythm: Normal rate and regular rhythm  Pulses: Normal pulses  Heart sounds: Normal heart sounds  Pulmonary:      Effort: Pulmonary effort is normal  No respiratory distress  Breath sounds: Normal breath sounds  No wheezing  Abdominal:      General: Bowel sounds are normal       Palpations: Abdomen is soft  Tenderness: There is no abdominal tenderness  Musculoskeletal:         General: Normal range of motion  Cervical back: Normal range of motion and neck supple  Skin:     General: Skin is warm and dry  Neurological:      Mental Status: She is alert and oriented to person, place, and time     Psychiatric:         Behavior: Behavior normal          JESSICA Da Silva

## 2022-06-23 NOTE — ASSESSMENT & PLAN NOTE
- Per patient, she was unable to  Zyrtec from her pharmacy because her insurance did not cover it  Recommended for patient to try purchasing it over-the-counter

## 2022-12-02 ENCOUNTER — OFFICE VISIT (OUTPATIENT)
Dept: OBGYN CLINIC | Facility: CLINIC | Age: 42
End: 2022-12-02

## 2022-12-02 ENCOUNTER — APPOINTMENT (OUTPATIENT)
Dept: LAB | Facility: CLINIC | Age: 42
End: 2022-12-02

## 2022-12-02 VITALS
WEIGHT: 171 LBS | BODY MASS INDEX: 27.48 KG/M2 | DIASTOLIC BLOOD PRESSURE: 89 MMHG | SYSTOLIC BLOOD PRESSURE: 127 MMHG | HEART RATE: 82 BPM | HEIGHT: 66 IN

## 2022-12-02 DIAGNOSIS — N92.6 IRREGULAR MENSES: ICD-10-CM

## 2022-12-02 DIAGNOSIS — N89.8 VAGINA ITCHING: ICD-10-CM

## 2022-12-02 DIAGNOSIS — N89.8 VAGINAL DISCHARGE: ICD-10-CM

## 2022-12-02 DIAGNOSIS — B37.31 VAGINAL CANDIDIASIS: ICD-10-CM

## 2022-12-02 DIAGNOSIS — Z59.82 INABILITY TO ACQUIRE TRANSPORTATION: Primary | ICD-10-CM

## 2022-12-02 PROBLEM — E66.3 OVERWEIGHT: Status: RESOLVED | Noted: 2022-04-12 | Resolved: 2022-12-02

## 2022-12-02 PROBLEM — J30.1 SEASONAL ALLERGIC RHINITIS DUE TO POLLEN: Status: RESOLVED | Noted: 2021-05-28 | Resolved: 2022-12-02

## 2022-12-02 LAB
BV WHIFF TEST VAG QL: ABNORMAL
CLUE CELLS SPEC QL WET PREP: ABNORMAL
FSH SERPL-ACNC: 11.5 MIU/ML
PH SMN: 4.5 [PH]
SL AMB POCT URINE HCG: NEGATIVE
SL AMB POCT WET MOUNT: ABNORMAL
T VAGINALIS VAG QL WET PREP: ABNORMAL
TSH SERPL DL<=0.05 MIU/L-ACNC: 0.84 UIU/ML (ref 0.45–4.5)
YEAST VAG QL WET PREP: ABNORMAL

## 2022-12-02 RX ORDER — FLUCONAZOLE 150 MG/1
150 TABLET ORAL ONCE
Qty: 1 TABLET | Refills: 0 | Status: SHIPPED | OUTPATIENT
Start: 2022-12-02 | End: 2022-12-02

## 2022-12-02 SDOH — ECONOMIC STABILITY - TRANSPORTATION SECURITY: TRANSPORTATION INSECURITY: Z59.82

## 2022-12-02 NOTE — PATIENT INSTRUCTIONS
Thank you for your confidence in our team    We appreciate you and welcome your feedback  If you receive a survey from us, please take a few moments to let us know how we are doing     Sincerely,  JOB Moore

## 2022-12-02 NOTE — PROGRESS NOTES
PROBLEM GYNECOLOGICAL VISIT    Delroy Nagy is a 42 y o  female who presents today with complaint of irregular menses and vaginal itching & discharge  Her general medical history has been reviewed and she reports it as follows:    Past Medical History:   Diagnosis Date   • Depression      Past Surgical History:   Procedure Laterality Date   • AXILLARY SURGERY Right 2013   •  SECTION  2016     OB History        1    Para   1    Term   1       0    AB   0    Living   1       SAB   0    IAB   0    Ectopic   0    Multiple   0    Live Births   1               Social History     Tobacco Use   • Smoking status: Never   • Smokeless tobacco: Never   Vaping Use   • Vaping Use: Never used   Substance Use Topics   • Alcohol use: Yes     Comment: couple times/month   • Drug use: Never     Social History     Substance and Sexual Activity   Sexual Activity Not Currently   • Partners: Male   • Birth control/protection: None       No current outpatient medications    History of Present Illness:   Reports no menses since mid-2022  Prior to that her menses have been very regular  Also reports for the past few weeks she has been experiencing vaginal itching and discharge  Review of Systems:  Review of Systems   Constitutional: Negative  Gastrointestinal: Negative  Genitourinary: Positive for menstrual problem, vaginal discharge and vaginal pain  Negative for dysuria and pelvic pain  Physical Exam:  /89   Pulse 82   Ht 5' 6" (1 676 m)   Wt 77 6 kg (171 lb)   LMP 2022 (Approximate)   BMI 27 60 kg/m²   Physical Exam  Constitutional:       General: She is not in acute distress  Neurological:      Mental Status: She is alert  Skin:     General: Skin is warm and dry  Vitals reviewed  Point of Care Testing:   -Wet mount: no clue cells, no trichomonads, few WBC's, pH=4 5   -KOH mount: + hyphae   -Whiff: negative   -urine pregnancy test: negative    Assessment:   1   Vaginal candidiasis  2  Irregular menses  Plan:   1  Given Rx Diflucan  2  Bloodwork ordered: TSH, FSH   3  Return to office in 1 month for annual GYN exam    4  Patient's depression screening was assessed with a PHQ-2 score of 1  Their PHQ-9 score was 4  Clinically patient does not have depression  No treatment is required

## 2022-12-05 ENCOUNTER — PATIENT OUTREACH (OUTPATIENT)
Dept: OBGYN CLINIC | Facility: CLINIC | Age: 42
End: 2022-12-05

## 2022-12-05 NOTE — PROGRESS NOTES
LEE ANN HESTER outreached pt today in regards to [facility] referral for transportation, pt would not qualify for lanta [facility] [place] services, she walks to appointments  LEE ANN HESTER offered to send her a bus pas for her next appointment to which she accepted  LEE ANN HESTER mailed her a one day bus pass, the route that would best suite her scheduled appointment time, as well as information on the $5 31 day lanta pass  She declined any other needs at this time

## 2023-01-06 ENCOUNTER — TELEPHONE (OUTPATIENT)
Dept: OBGYN CLINIC | Facility: CLINIC | Age: 43
End: 2023-01-06

## 2023-03-14 ENCOUNTER — TELEPHONE (OUTPATIENT)
Dept: OBGYN CLINIC | Facility: CLINIC | Age: 43
End: 2023-03-14

## 2023-04-24 ENCOUNTER — TELEPHONE (OUTPATIENT)
Dept: FAMILY MEDICINE CLINIC | Facility: CLINIC | Age: 43
End: 2023-04-24

## 2023-04-24 DIAGNOSIS — E66.3 OVERWEIGHT: Primary | ICD-10-CM

## 2023-04-24 NOTE — TELEPHONE ENCOUNTER
Natan Arriaga, it's Gloria  I'm calling because I need a new referral because I don't remember the last time that I seen you and I was telling you about that  I wanted to do this program for weight loss for women but I never had, I didn't never get the chance to call them until this month right here  But the referral is not no good It's due to it was last year referral  I didn't get a chance to use it  So I was hoping if I can get come down there and get a new one day this week right here and you can call me back at [phone] This is my cell phone number  My YOB: 1980 and I'll be waiting for your call and have a wonderful and great day

## 2023-05-09 ENCOUNTER — TELEPHONE (OUTPATIENT)
Dept: OTHER | Facility: OTHER | Age: 43
End: 2023-05-09

## 2023-05-09 NOTE — TELEPHONE ENCOUNTER
Patient is calling regarding cancelling an appointment      Date/Time:5/9/23 @ 10:30am    Patient was rescheduled: YES [x] NO []    Patient requesting call back to reschedule: YES [] NO [x]

## 2023-05-23 ENCOUNTER — ANNUAL EXAM (OUTPATIENT)
Dept: OBGYN CLINIC | Facility: CLINIC | Age: 43
End: 2023-05-23

## 2023-05-23 VITALS
DIASTOLIC BLOOD PRESSURE: 81 MMHG | SYSTOLIC BLOOD PRESSURE: 135 MMHG | HEART RATE: 84 BPM | WEIGHT: 178.2 LBS | BODY MASS INDEX: 28.76 KG/M2

## 2023-05-23 DIAGNOSIS — Z01.419 ENCOUNTER FOR GYNECOLOGICAL EXAMINATION WITHOUT ABNORMAL FINDING: Primary | ICD-10-CM

## 2023-05-23 DIAGNOSIS — Z12.31 ENCOUNTER FOR SCREENING MAMMOGRAM FOR MALIGNANT NEOPLASM OF BREAST: ICD-10-CM

## 2023-05-23 DIAGNOSIS — Z13.31 POSITIVE DEPRESSION SCREENING: ICD-10-CM

## 2023-05-23 DIAGNOSIS — Z12.4 SCREENING FOR CERVICAL CANCER: ICD-10-CM

## 2023-05-23 DIAGNOSIS — Z23 NEED FOR HPV VACCINATION: ICD-10-CM

## 2023-05-23 DIAGNOSIS — Z12.39 ENCOUNTER FOR BREAST CANCER SCREENING USING NON-MAMMOGRAM MODALITY: ICD-10-CM

## 2023-05-23 NOTE — PATIENT INSTRUCTIONS
Thank you for your confidence in our team    We appreciate you and welcome your feedback  If you receive a survey from us, please take a few moments to let us know how we are doing     Sincerely,  Candice Singh

## 2023-05-23 NOTE — LETTER
2023    To Avani Joyce  : 1980      This letter is to advise you that your recent PAP SMEAR results were reviewed by me and are NORMAL    We will see you in 1 year for your annual exam     Amarilys Agarwal

## 2023-05-23 NOTE — PROGRESS NOTES
Sonny Maya is a 36 y o  female who presents today for annual GYN exam   Her last pap smear was performed 3/25/2019 and result was NILM with negative HPV  She reports no history of abnormal pap smears in her past   She has never had a mammogram performed previously  She reports menses as regular  Patient's last menstrual period was 2023 (approximate)  Her general medical history has been reviewed and she reports it as follows:    Past Medical History:   Diagnosis Date   • Depression      Past Surgical History:   Procedure Laterality Date   • AXILLARY SURGERY Right    •  SECTION  2016     OB History        1    Para   1    Term   1       0    AB   0    Living   1       SAB   0    IAB   0    Ectopic   0    Multiple   0    Live Births   1               Social History     Tobacco Use   • Smoking status: Never   • Smokeless tobacco: Never   Vaping Use   • Vaping Use: Never used   Substance Use Topics   • Alcohol use: Not Currently     Comment: couple times/month   • Drug use: Never     Social History     Substance and Sexual Activity   Sexual Activity Yes   • Partners: Male   • Birth control/protection: None     Cancer-related family history is negative for Cancer, Breast cancer, Colon cancer, and Ovarian cancer  No current outpatient medications    Review of Systems:  Review of Systems   Constitutional: Negative  Gastrointestinal: Negative  Genitourinary: Negative for difficulty urinating, menstrual problem, pelvic pain and vaginal discharge  Skin: Negative  Physical Exam:  /81   Pulse 84   Wt 80 8 kg (178 lb 3 2 oz)   LMP 2023 (Approximate)   BMI 28 76 kg/m²   Physical Exam  Constitutional:       General: She is not in acute distress  Appearance: She is well-developed  Genitourinary:      Vulva normal       No lesions in the vagina  Right Adnexa: not tender and no mass present       Left Adnexa: not tender and no mass present  No cervical motion tenderness or lesion  Uterus is not tender  Breasts:     Right: No mass, nipple discharge, skin change or tenderness  Left: No mass, nipple discharge, skin change or tenderness  Neck:      Thyroid: No thyromegaly  Cardiovascular:      Rate and Rhythm: Normal rate and regular rhythm  Pulmonary:      Effort: Pulmonary effort is normal    Abdominal:      Palpations: Abdomen is soft  Tenderness: There is no abdominal tenderness  Musculoskeletal:      Cervical back: Neck supple  Neurological:      Mental Status: She is alert and oriented to person, place, and time  Skin:     General: Skin is warm and dry  Vitals reviewed  Assessment/Plan:   1  Normal well-woman GYN exam   2  Cervical cancer screening:  Normal cervical exam   Pap smear done with HPV co-testing  Has not received HPV vaccine in the past, but desires to initiate vaccine series today  Given today and should return in 1 and 6 months for subsequent vaccinations  3  STD screening:  Orders placed for vaginal GC/CT cultures  Orders placed for serum anti-HIV, anti-HCV, HbsAg, syphilis panel  4  Breast cancer screening:  Normal breast exam   Order placed for bilateral screening mammogram   Reviewed breast self-awareness  5  Depression Screening: Patient's depression screening was assessed with a PHQ-2 score of 2  Their PHQ-9 score was 12  Patient assessed for underlying major depression  They have no active suicidal ideations  Brief counseling provided and recommend additional follow-up/re-evaluation next office visit  Referral placed for  consultation  6  BMI Counseling: Body mass index is 28 76 kg/m²  No intervention indicated  7  Contraception:  Declines     8  Return to office in 1 year for annual GYN exam

## 2023-05-23 NOTE — LETTER
2023    To Haroon CHRISTIANSEN: 1980      This letter is to advise you that your recent CULTURES for gonorrhea and chlamydia were reviewed by me and are NORMAL  Please contact the office for an appointment if you have any additional concerns      [address] [facility] Tom Mckenzie

## 2023-05-25 LAB
C TRACH DNA SPEC QL NAA+PROBE: NEGATIVE
HPV HR 12 DNA CVX QL NAA+PROBE: NEGATIVE
HPV16 DNA CVX QL NAA+PROBE: NEGATIVE
HPV18 DNA CVX QL NAA+PROBE: NEGATIVE
N GONORRHOEA DNA SPEC QL NAA+PROBE: NEGATIVE

## 2023-05-30 LAB
LAB AP GYN PRIMARY INTERPRETATION: NORMAL
Lab: NORMAL

## 2023-06-27 ENCOUNTER — PATIENT OUTREACH (OUTPATIENT)
Dept: OBGYN CLINIC | Facility: CLINIC | Age: 43
End: 2023-06-27

## 2023-06-27 NOTE — PROGRESS NOTES
1st attempt to outreach in regards to SDOH/transportation hardship referral, no answer, no ability to leave a VM

## 2023-07-05 ENCOUNTER — CLINICAL SUPPORT (OUTPATIENT)
Dept: OBGYN CLINIC | Facility: CLINIC | Age: 43
End: 2023-07-05

## 2023-07-05 VITALS
DIASTOLIC BLOOD PRESSURE: 84 MMHG | WEIGHT: 185.8 LBS | SYSTOLIC BLOOD PRESSURE: 140 MMHG | HEIGHT: 66 IN | BODY MASS INDEX: 29.86 KG/M2 | HEART RATE: 79 BPM

## 2023-07-05 DIAGNOSIS — Z23 NEED FOR HPV VACCINATION: Primary | ICD-10-CM

## 2023-07-05 NOTE — PROGRESS NOTES
Pt here for second dose of HPV. Given in right arm.      [address] - [id number]  LOT - [id number]  EXP - 1/20/2025

## 2023-07-19 ENCOUNTER — PATIENT OUTREACH (OUTPATIENT)
Dept: OBGYN CLINIC | Facility: CLINIC | Age: 43
End: 2023-07-19

## 2023-07-19 NOTE — PROGRESS NOTES
2nd attempt to outreach in regards to transport concerns, no answer, no ability to leave a VM at this time. LEE ANN CM did provide pt with one bus pass last year for transport, will remain available as needed.

## 2023-07-27 ENCOUNTER — TELEPHONE (OUTPATIENT)
Dept: OBGYN CLINIC | Facility: CLINIC | Age: 43
End: 2023-07-27

## 2023-07-27 ENCOUNTER — HOSPITAL ENCOUNTER (OUTPATIENT)
Dept: MAMMOGRAPHY | Facility: CLINIC | Age: 43
End: 2023-07-27
Payer: MEDICARE

## 2023-07-27 ENCOUNTER — APPOINTMENT (OUTPATIENT)
Dept: LAB | Facility: CLINIC | Age: 43
End: 2023-07-27
Payer: MEDICARE

## 2023-07-27 DIAGNOSIS — Z12.31 ENCOUNTER FOR SCREENING MAMMOGRAM FOR MALIGNANT NEOPLASM OF BREAST: ICD-10-CM

## 2023-07-27 DIAGNOSIS — N92.6 MISSED PERIOD: Primary | ICD-10-CM

## 2023-07-27 LAB — TREPONEMA PALLIDUM IGG+IGM AB [PRESENCE] IN SERUM OR PLASMA BY IMMUNOASSAY: NORMAL

## 2023-07-27 PROCEDURE — 87340 HEPATITIS B SURFACE AG IA: CPT

## 2023-07-27 PROCEDURE — 36415 COLL VENOUS BLD VENIPUNCTURE: CPT

## 2023-07-27 PROCEDURE — 86780 TREPONEMA PALLIDUM: CPT

## 2023-07-27 PROCEDURE — 87389 HIV-1 AG W/HIV-1&-2 AB AG IA: CPT

## 2023-07-27 PROCEDURE — 86803 HEPATITIS C AB TEST: CPT

## 2023-07-28 LAB
HBV SURFACE AG SER QL: NORMAL
HCV AB SER QL: NORMAL
HIV 1+2 AB+HIV1 P24 AG SERPL QL IA: NORMAL
HIV 2 AB SERPL QL IA: NORMAL
HIV1 AB SERPL QL IA: NORMAL
HIV1 P24 AG SERPL QL IA: NORMAL

## 2023-07-31 ENCOUNTER — TELEPHONE (OUTPATIENT)
Dept: OBGYN CLINIC | Facility: CLINIC | Age: 43
End: 2023-07-31

## 2023-08-14 ENCOUNTER — TELEPHONE (OUTPATIENT)
Dept: BARIATRICS | Facility: CLINIC | Age: 43
End: 2023-08-14

## 2023-10-31 ENCOUNTER — HOSPITAL ENCOUNTER (EMERGENCY)
Facility: HOSPITAL | Age: 43
Discharge: HOME | End: 2023-10-31
Attending: EMERGENCY MEDICINE
Payer: MEDICARE

## 2023-10-31 VITALS
DIASTOLIC BLOOD PRESSURE: 50 MMHG | SYSTOLIC BLOOD PRESSURE: 143 MMHG | RESPIRATION RATE: 18 BRPM | HEART RATE: 112 BPM | BODY MASS INDEX: 29.68 KG/M2 | WEIGHT: 183.9 LBS | OXYGEN SATURATION: 98 % | TEMPERATURE: 98.7 F

## 2023-10-31 DIAGNOSIS — J06.9 VIRAL URI WITH COUGH: Primary | ICD-10-CM

## 2023-10-31 PROCEDURE — 87636 SARSCOV2 & INF A&B AMP PRB: CPT

## 2023-10-31 PROCEDURE — 99283 EMERGENCY DEPT VISIT LOW MDM: CPT

## 2023-10-31 PROCEDURE — 99284 EMERGENCY DEPT VISIT MOD MDM: CPT

## 2023-10-31 RX ORDER — FLUTICASONE PROPIONATE 50 MCG
1 SPRAY, SUSPENSION (ML) NASAL DAILY
Qty: 16 G | Refills: 0 | Status: SHIPPED | OUTPATIENT
Start: 2023-10-31

## 2023-10-31 RX ORDER — GUAIFENESIN/DEXTROMETHORPHAN 100-10MG/5
5 SYRUP ORAL 3 TIMES DAILY PRN
Qty: 118 ML | Refills: 0 | Status: SHIPPED | OUTPATIENT
Start: 2023-10-31 | End: 2023-11-10

## 2023-10-31 RX ORDER — IBUPROFEN 400 MG/1
400 TABLET ORAL EVERY 6 HOURS PRN
Qty: 12 TABLET | Refills: 0 | Status: SHIPPED | OUTPATIENT
Start: 2023-10-31

## 2023-10-31 NOTE — DISCHARGE INSTRUCTIONS
Follow up with PCP. We will call with any positive results from your covid-19/influenza test in 1-2 business days, all results will be available on your MyChart. Return to ED for new or worsening symptoms as discussed.

## 2023-10-31 NOTE — ED PROVIDER NOTES
Chief Complaint   Patient presents with    Cough     Cough, congestion, sore throat, for 2 days       HPI: Patient is a 36 y.o. female with PMH of depression who presents with 2 days of cough, sore throat, and nasal congestion  which the patient describes at mild The patient has had contact with people with similar symptoms. The patient has not taken any medication. Denies F, chills, CP, SOB, hemoptysis, leg swelling, voice change, dysphagia, neck pain or stiffness, rash, vomiting. No Known Allergies    Past Medical History:   Diagnosis Date    Depression       Past Surgical History:   Procedure Laterality Date    AXILLARY SURGERY Right 2013     SECTION  2016     Social History     Tobacco Use    Smoking status: Never     Passive exposure: Never    Smokeless tobacco: Never   Vaping Use    Vaping Use: Never used   Substance Use Topics    Alcohol use: Not Currently     Comment: couple times/month    Drug use: Never       Nursing notes reviewed  Physical Exam:  ED Triage Vitals [10/31/23 0810]   Temperature Pulse Respirations Blood Pressure SpO2   98.7 °F (37.1 °C) (!) 112 18 143/50 98 %      Temp Source Heart Rate Source Patient Position - Orthostatic VS BP Location FiO2 (%)   Tympanic Monitor Sitting Left arm --      Pain Score       --           ROS: Positive for cough, sore throat, and nasal congestion , the remainder of a 10 organ system ROS was otherwise unremarkable. General: awake, alert, no acute distress  Head: normocephalic, atraumatic  Eyes: no scleral icterus, no discharge   Ears: external ears normal, hearing grossly intact  Nose: external exam grossly normal, negative nasal discharge, congestion   Mouth:  Erythematous oropharynx without swelling or exudate. Uvula midline. No tonsillar swelling, exudates or abscesses.   Neck: symmetric, No JVD noted, trachea midline, no anterior cervical lymphadenopathy and a full range of motion of neck without pain  Pulmonary: Patient in no respiratory distress, speaking in full sentences, managing oral secretions without difficulty, no accessory muscle use, retractions, or belly breathing noted, no adventitious lung sounds auscultated bilaterally. Cardiovascular: appears well perfused, RRR, no murmurs   Abdomen: no distention noted, no tenderness   Musculoskeletal: no deformities noted, tone normal  Neuro: grossly non-focal  Psych: mood and affect appropriate  Skin: warm, dry, no rash    DDX includes but not limited to rhinitis, sinusitis, pertussis, bronchitis, PNA, influenza, Covid-19, allergies, strep pharyngitis. The patient is stable and has a history and physical exam consistent with a viral illness. No respiratory distress. Afebrile. No focal lung findings, low clinical suspicion for PNA. Low clinical suspicion for RPA/PTA given exam findings. centor score of 0, do not feel strep testing is necessary at this time. No overt indications for antibiotics. COVID19/influenza testing has been performed. I considered the patient's other medical conditions as applicable/noted above in my medical decision making. The patient is stable upon discharge. The plan is for supportive care at home. The patient (and any family present) verbalized understanding of the discharge instructions and warnings that would necessitate return to the Emergency Department. All questions were answered prior to discharge. Medications - No data to display  Final diagnoses:   Viral URI with cough     Time reflects when diagnosis was documented in both MDM as applicable and the Disposition within this note       Time User Action Codes Description Comment    10/31/2023  8:46 AM Jesús Dhillon Add [J06.9] Viral URI with cough           ED Disposition       ED Disposition   Discharge    Condition   Stable    Date/Time   Tue Oct 31, 2023  8:46 AM    Comment   Sridhar Xionging discharge to home/self care.                    Follow-up Information       Follow up With Specialties Details Why [id number] , [address]   [address]  [address]            Discharge Medication List as of 10/31/2023  8:48 AM        START taking these medications    Details   dextromethorphan-guaiFENesin (ROBITUSSIN DM)  mg/5 mL syrup Take 5 mL by mouth 3 (three) times a day as needed (cough) for up to 10 days, Starting Tue 10/31/2023, Until Fri 11/10/2023 at 2359, Normal      fluticasone (FLONASE) 50 mcg/act nasal spray 1 spray into each nostril daily, Starting Tue 10/31/2023, Normal      ibuprofen (MOTRIN) 400 mg tablet Take 1 tablet (400 mg total) by mouth every 6 (six) hours as needed for mild pain, Starting Tue 10/31/2023, Normal      menthol-cetylpyridinium (CEPACOL) 3 MG lozenge Take 1 lozenge (3 mg total) by mouth as needed for sore throat, Starting Tue 10/31/2023, Normal           No discharge procedures on file.     Electronically Signed by       Андрей Malone PA-C  11/01/23 [id number]

## 2023-11-01 LAB
FLUAV RNA RESP QL NAA+PROBE: NEGATIVE
FLUBV RNA RESP QL NAA+PROBE: NEGATIVE
SARS-COV-2 RNA RESP QL NAA+PROBE: NEGATIVE

## 2024-04-19 ENCOUNTER — TELEPHONE (OUTPATIENT)
Dept: OBGYN CLINIC | Facility: CLINIC | Age: 44
End: 2024-04-19

## 2024-04-22 NOTE — TELEPHONE ENCOUNTER
I returned pt's call.  She advises me that her last menses was in 2/2024 and it was very light bleeding and lasted only a few days which is abnormal for her.  States she took a pregnancy test and it was negative but she doesn't believe it.  States she feels like she is pregnant and thinks she feels a baby moving.    Advised her to make an appointment to come in for evaluation and she agrees.  States she will call in the morning as she is busy right now.

## 2024-05-21 NOTE — PROGRESS NOTES
6/4/21  RN Outpatient Care Manager    Call received from mother, Hipolito Muniz  She stated that she wished to "just talk with CM"  With further conversation, she stated that she is having dental surgery on 6/17 and is having three teeth extracted under sedation  Mother stated that father will be home to care for her and Svetlana Oneal on the surgical date as well as the Friday afterwards  Cheri stated that she was also approved for partials for her missing teeth  Provided emotional support and encouragement as mother stated she is very nervous and anxious about procedures; she reported "it's all I can think about right now"  Discussed ideas for managing anxiety such as going for a walk, listening to music, coloring, journalling, watch a good movie  Cheri was agreeable try those ideas for her stress  Chief complaint: Status post puncture to plantar aspect the left foot sustained on May 13, 2024    Patient is a 56-year-old female who presents today for evaluation of the left foot.  Patient reports to have knocked down a wine glass from a counter and eventually to stepped on the glass.  Eventually she presented to the local ER where she was under the impression to have a foreign body in the foot however this could not be visualized on x-rays.  She was told to follow-up with us.    Patient reports to have quite a bit of pain and discomfort on the plantar aspect of the foot and is still to be under the impression that there is a foreign body within the foot itself.    Patient reports to be otherwise somewhat active and reports to walk and a [employer] shop which is a combination of sitting and standing.    I reviewed today her past medical and surgical history current medications and drug allergies.    On today's visit she presents as a pleasant female in no apparent distress with a weight of 278 pounds and a BMI of 47.7.    On today's visit she presents with a puncture wound along the plantar aspect of the foot which is noninfected.  There is some very mild discomfort on palpation.  I could not feel any induration or suggestion of a foreign body deep.  From the dermis.  Presents with otherwise full range of motion of the ankle hindfoot and midfoot joints    Plain x-rays of the foot were reviewed today which again do not show to have any radiodense foreign body.    Assessment: Status post puncture to the plantar aspect of the left foot    Plan: I discussed with patient that at this point I believe that the pain that she has is just from the trauma that she has undergone.  Patient was started impression that the pain is coming from the foreign body    I offered her the possibility of undergoing an ultrasound of the foot to rule out a foreign body however she chose to proceed with observation for a couple of weeks.  If in 2  weeks from now she is not making progress although she may still have some symptoms then we will proceed with an MRI of the plantar aspect of the left foot to rule out the presence of a foreign body    All questions were answered.  Patient was pleased with the discussion. Patient will follow-up accordingly.  Otherwise patient has no restrictions.    TT 30 minutes

## 2024-06-19 ENCOUNTER — HOSPITAL ENCOUNTER (EMERGENCY)
Facility: HOSPITAL | Age: 44
Discharge: HOME | End: 2024-06-19
Attending: EMERGENCY MEDICINE
Payer: MEDICARE

## 2024-06-19 ENCOUNTER — APPOINTMENT (EMERGENCY)
Dept: RADIOLOGY | Facility: HOSPITAL | Age: 44
End: 2024-06-19
Payer: MEDICARE

## 2024-06-19 VITALS
HEART RATE: 88 BPM | WEIGHT: 190.7 LBS | OXYGEN SATURATION: 100 % | SYSTOLIC BLOOD PRESSURE: 136 MMHG | DIASTOLIC BLOOD PRESSURE: 80 MMHG | RESPIRATION RATE: 18 BRPM | BODY MASS INDEX: 30.78 KG/M2 | TEMPERATURE: 97.6 F

## 2024-06-19 DIAGNOSIS — J06.9 VIRAL URI WITH COUGH: Primary | ICD-10-CM

## 2024-06-19 DIAGNOSIS — J02.0 STREPTOCOCCAL PHARYNGITIS: ICD-10-CM

## 2024-06-19 LAB — S PYO DNA THROAT QL NAA+PROBE: DETECTED

## 2024-06-19 PROCEDURE — 99283 EMERGENCY DEPT VISIT LOW MDM: CPT

## 2024-06-19 PROCEDURE — 71046 X-RAY EXAM CHEST 2 VIEWS: CPT

## 2024-06-19 PROCEDURE — 99284 EMERGENCY DEPT VISIT MOD MDM: CPT | Performed by: PHYSICIAN ASSISTANT

## 2024-06-19 PROCEDURE — 87651 STREP A DNA AMP PROBE: CPT | Performed by: PHYSICIAN ASSISTANT

## 2024-06-19 RX ORDER — AMOXICILLIN 500 MG/1
500 CAPSULE ORAL 3 TIMES DAILY
Qty: 30 CAPSULE | Refills: 0 | Status: SHIPPED | OUTPATIENT
Start: 2024-06-19 | End: 2024-06-29

## 2024-06-19 NOTE — ED PROVIDER NOTES
History  Chief Complaint   Patient presents with    Cough     Coughing nonstop for about a week and a half. Reports she is bring up yellow. No fevers. Reports nasal discharge. Reports she has been drinking tea     43-year-old female presents the emergency department with complaints of a cough.  States that she has been having a persistent cough over the past 7 to 10 days.  States that sometimes she has episodes of posttussive vomiting and is bringing up some yellow sputum.  Denies fevers or shortness of breath.  Additionally reports some mild nasal congestion.  No sore throat.      History provided by:  Patient   used: No    Cough  Sputum characteristics:  Yellow  Severity:  Moderate  Onset quality:  Gradual  Duration:  10 days  Timing:  Constant  Progression:  Waxing and waning  Chronicity:  New  Relieved by:  Nothing  Ineffective treatments: Drinking tea.  Associated symptoms: sinus congestion    Associated symptoms: no chest pain, no chills, no diaphoresis, no ear fullness, no ear pain, no eye discharge, no fever, no headaches, no myalgias, no rash, no rhinorrhea, no shortness of breath, no sore throat, no weight loss and no wheezing        Prior to Admission Medications   Prescriptions Last Dose Informant Patient Reported? Taking?   fluticasone (FLONASE) 50 mcg/act nasal spray   No No   Si spray into each nostril daily   ibuprofen (MOTRIN) 400 mg tablet   No No   Sig: Take 1 tablet (400 mg total) by mouth every 6 (six) hours as needed for mild pain   menthol-cetylpyridinium (CEPACOL) 3 MG lozenge   No No   Sig: Take 1 lozenge (3 mg total) by mouth as needed for sore throat      Facility-Administered Medications: None       Past Medical History:   Diagnosis Date    Depression        Past Surgical History:   Procedure Laterality Date    AXILLARY SURGERY Right 2013     SECTION  2016       Family History   Problem Relation Age of Onset    Hypertension Family     Cancer Neg Hx      Breast cancer Neg Hx     Colon cancer Neg Hx     Ovarian cancer Neg Hx      I have reviewed and agree with the history as documented.    E-Cigarette/Vaping    E-Cigarette Use Never User      E-Cigarette/Vaping Substances     Social History     Tobacco Use    Smoking status: Never     Passive exposure: Never    Smokeless tobacco: Never   Vaping Use    Vaping status: Never Used   Substance Use Topics    Alcohol use: Not Currently     Comment: couple times/month    Drug use: Never       Review of Systems   Constitutional:  Negative for activity change, chills, diaphoresis, fever and weight loss.   HENT:  Negative for dental problem, drooling, ear pain, mouth sores, rhinorrhea, sinus pressure, sneezing, sore throat, tinnitus and trouble swallowing.    Eyes:  Negative for pain, discharge and itching.   Respiratory:  Positive for cough. Negative for chest tightness, shortness of breath and wheezing.    Cardiovascular:  Negative for chest pain.   Musculoskeletal:  Negative for myalgias.   Skin:  Negative for rash and wound.   Neurological:  Negative for dizziness, light-headedness and headaches.   All other systems reviewed and are negative.      Physical Exam  Physical Exam  Vitals and nursing note reviewed.   Constitutional:       Appearance: She is well-developed.   HENT:      Head: Normocephalic and atraumatic.      Right Ear: External ear normal.      Left Ear: External ear normal.      Mouth/Throat:      Mouth: Mucous membranes are moist.      Pharynx: Oropharynx is clear. No oropharyngeal exudate or posterior oropharyngeal erythema.   Cardiovascular:      Rate and Rhythm: Normal rate and regular rhythm.   Pulmonary:      Effort: Pulmonary effort is normal. No respiratory distress.      Breath sounds: No wheezing, rhonchi or rales.   Skin:     General: Skin is warm and dry.   Neurological:      Mental Status: She is alert and oriented to person, place, and time.   Psychiatric:         Mood and Affect: Mood normal.          Behavior: Behavior normal.         Vital Signs  ED Triage Vitals [06/19/24 0954]   Temperature Pulse Respirations Blood Pressure SpO2   97.6 °F (36.4 °C) 88 18 136/80 100 %      Temp Source Heart Rate Source Patient Position - Orthostatic VS BP Location FiO2 (%)   Tympanic Monitor Sitting Left arm --      Pain Score       --           Vitals:    06/19/24 0954   BP: 136/80   Pulse: 88   Patient Position - Orthostatic VS: Sitting         Visual Acuity      ED Medications  Medications - No data to display    Diagnostic Studies  Results Reviewed       Procedure Component Value Units Date/Time    Strep A PCR [[id number]] Collected: 06/19/24 1115    Lab Status: In process Specimen: Throat Updated: 06/19/24 1118                   XR chest 2 views   Final Result by Brian Da Silva MD (06/19 1103)      No acute cardiopulmonary disease.            Workstation performed: [id number]                    Procedures  Procedures         ED Course  ED Course as of 06/19/24 1119   Wed Jun 19, 2024   1118 Daughter tested positive for strep throat.  Seen at same time during emergency department visit.  Will test for strep prior to dispo.                               SBIRT 20yo+      Flowsheet Row Most Recent Value   Initial Alcohol Screen: US AUDIT-C     1. How often do you have a drink containing alcohol? 0 Filed at: 06/19/2024 1103   2. How many drinks containing alcohol do you have on a typical day you are drinking?  0 Filed at: 06/19/2024 1103   3a. Male UNDER 65: How often do you have five or more drinks on one occasion? 0 Filed at: 06/19/2024 1103   3b. FEMALE Any Age, or MALE 65+: How often do you have 4 or more drinks on one occassion? 0 Filed at: 06/19/2024 1103   Audit-C Score 0 Filed at: 06/19/2024 1103   MATILDA: How many times in the past year have you...    Used an illegal drug or used a prescription medication for non-medical reasons? Never Filed at: 06/19/2024 1103                      Medical Decision  Making  Differential diagnosis includes but not limited to: Asthma, bronchitis, pneumonia    Amount and/or Complexity of Data Reviewed  Labs: ordered.  Radiology: ordered.             Disposition  Final diagnoses:   Viral URI with cough     Time reflects when diagnosis was documented in both MDM as applicable and the Disposition within this note       Time User Action Codes Description Comment    6/19/2024 11:13 AM Unique Hood Add [J06.9] Viral URI with cough           ED Disposition       ED Disposition   Discharge    Condition   Stable    Date/Time   Wed Jun 19, 2024 1113    Comment   Cheri Willingham discharge to home/self care.                   Follow-up Information       Follow up With Specialties Details Why Contact Info    Corina Kruger PA-C Family [facility]   [address] [place] [id number]  [phone]              Patient's Medications   Discharge Prescriptions    No medications on file       No discharge procedures on file.    PDMP Review       None            ED Provider  Electronically Signed by             Unique Hood PA-C  06/19/24 1115

## 2024-09-05 ENCOUNTER — APPOINTMENT (EMERGENCY)
Dept: RADIOLOGY | Facility: HOSPITAL | Age: 44
End: 2024-09-05
Payer: MEDICARE

## 2024-09-05 ENCOUNTER — TELEPHONE (OUTPATIENT)
Age: 44
End: 2024-09-05

## 2024-09-05 ENCOUNTER — HOSPITAL ENCOUNTER (EMERGENCY)
Facility: HOSPITAL | Age: 44
Discharge: HOME | End: 2024-09-05
Attending: INTERNAL MEDICINE
Payer: MEDICARE

## 2024-09-05 VITALS
HEART RATE: 88 BPM | RESPIRATION RATE: 16 BRPM | WEIGHT: 192 LBS | DIASTOLIC BLOOD PRESSURE: 86 MMHG | OXYGEN SATURATION: 96 % | SYSTOLIC BLOOD PRESSURE: 140 MMHG | BODY MASS INDEX: 30.99 KG/M2 | TEMPERATURE: 98.4 F

## 2024-09-05 DIAGNOSIS — M79.675 TOE PAIN, LEFT: Primary | ICD-10-CM

## 2024-09-05 DIAGNOSIS — S92.912A TOE FRACTURE, LEFT: ICD-10-CM

## 2024-09-05 PROCEDURE — 99284 EMERGENCY DEPT VISIT MOD MDM: CPT | Performed by: INTERNAL MEDICINE

## 2024-09-05 PROCEDURE — 99283 EMERGENCY DEPT VISIT LOW MDM: CPT

## 2024-09-05 PROCEDURE — 73660 X-RAY EXAM OF TOE(S): CPT

## 2024-09-05 RX ORDER — SENNOSIDES 8.6 MG
650 CAPSULE ORAL EVERY 8 HOURS PRN
Qty: 30 TABLET | Refills: 0 | Status: SHIPPED | OUTPATIENT
Start: 2024-09-05

## 2024-09-05 RX ORDER — NAPROXEN 500 MG/1
500 TABLET ORAL ONCE
Status: COMPLETED | OUTPATIENT
Start: 2024-09-05 | End: 2024-09-05

## 2024-09-05 RX ORDER — ACETAMINOPHEN 325 MG/1
975 TABLET ORAL ONCE
Status: COMPLETED | OUTPATIENT
Start: 2024-09-05 | End: 2024-09-05

## 2024-09-05 RX ORDER — NAPROXEN 500 MG/1
500 TABLET ORAL 2 TIMES DAILY WITH MEALS
Qty: 30 TABLET | Refills: 0 | Status: SHIPPED | OUTPATIENT
Start: 2024-09-05

## 2024-09-05 RX ADMIN — NAPROXEN 500 MG: 500 TABLET ORAL at 09:35

## 2024-09-05 RX ADMIN — ACETAMINOPHEN 975 MG: 325 TABLET ORAL at 09:35

## 2024-09-05 NOTE — DISCHARGE INSTRUCTIONS
Use Tylenol, Motrin and ice for pain relief.    Avoid activities and sports that may cause further injury, stress or pain.     If pain continues or worsens or fails to improve, follow up with podiatry, orthopedics, PCP or in the ER

## 2024-09-05 NOTE — ED PROVIDER NOTES
History  Chief Complaint   Patient presents with    Toe Injury     Pt state she struck her left 5th toe on a weight yesterday, pain and swelling to same     HPI  43-year-old woman presents to ED with left fifth toe pain.  She reports she accidentally struck her left toe on a weight yesterday.  She woke up today and was doing fine but after moving around for some time she had pain.  She reports any movement of the fifth toe makes the pain worse.  She denies any other aggravating factors.  Denies any alleviating factors.  She has not tried any medications or therapies for symptoms.  She is able to ambulate.  No falls, no other injuries, no other complaints or concerns.    Prior to Admission Medications   Prescriptions Last Dose Informant Patient Reported? Taking?   fluticasone (FLONASE) 50 mcg/act nasal spray   No No   Si spray into each nostril daily   ibuprofen (MOTRIN) 400 mg tablet   No No   Sig: Take 1 tablet (400 mg total) by mouth every 6 (six) hours as needed for mild pain   menthol-cetylpyridinium (CEPACOL) 3 MG lozenge   No No   Sig: Take 1 lozenge (3 mg total) by mouth as needed for sore throat      Facility-Administered Medications: None       Past Medical History:   Diagnosis Date    Depression        Past Surgical History:   Procedure Laterality Date    AXILLARY SURGERY Right 2013     SECTION  2016       Family History   Problem Relation Age of Onset    Hypertension Family     Cancer Neg Hx     Breast cancer Neg Hx     Colon cancer Neg Hx     Ovarian cancer Neg Hx      I have reviewed and agree with the history as documented.    E-Cigarette/Vaping    E-Cigarette Use Never User      E-Cigarette/Vaping Substances     Social History     Tobacco Use    Smoking status: Never     Passive exposure: Never    Smokeless tobacco: Never   Vaping Use    Vaping status: Never Used   Substance Use Topics    Alcohol use: Not Currently     Comment: couple times/month    Drug use: Never       Review of Systems    All other systems reviewed and are negative.      Physical Exam  Physical Exam  On exam the patient is awake, alert, and comfortable. Mucous membranes are moist. The patient's heart is regular. No respiratory distress.  Abdomen non-distended. Moves all extremities. Patient neurologically nonfocal. No skin rashes. Back without deformities.  Left fifth toe with tenderness to palpation, no crepitus, no bony deformity, no skin streaking, no erythema, no edema      Vital Signs  ED Triage Vitals   Temperature Pulse Respirations Blood Pressure SpO2   09/05/24 0856 09/05/24 0856 09/05/24 0856 09/05/24 0856 09/05/24 0856   98.4 °F (36.9 °C) 88 16 140/86 96 %      Temp Source Heart Rate Source Patient Position - Orthostatic VS BP Location FiO2 (%)   09/05/24 0856 09/05/24 0856 09/05/24 0856 09/05/24 0856 --   Oral Monitor Sitting Right arm       Pain Score       09/05/24 0935       6           Vitals:    09/05/24 0856   BP: 140/86   Pulse: 88   Patient Position - Orthostatic VS: Sitting         Visual Acuity      ED Medications  Medications   naproxen (NAPROSYN) tablet 500 mg (500 mg Oral Given 9/5/24 0935)   acetaminophen (TYLENOL) tablet 975 mg (975 mg Oral Given 9/5/24 0935)       Diagnostic Studies  Results Reviewed       None                   XR toe fifth min 2 views LEFT   ED Interpretation by Jenny Domingo MD (09/05 1009)   Fracture of the left proximal fifth toe                 Procedures  Procedures         ED Course                                 SBIRT 20yo+      Flowsheet Row Most Recent Value   Initial Alcohol Screen: US AUDIT-C     1. How often do you have a drink containing alcohol? 0 Filed at: 09/05/2024 0906   2. How many drinks containing alcohol do you have on a typical day you are drinking?  0 Filed at: 09/05/2024 0906   3b. FEMALE Any Age, or MALE 65+: How often do you have 4 or more drinks on one occassion? 0 Filed at: 09/05/2024 0906   Audit-C Score 0 Filed at: 09/05/2024 0906   MATILDA: How many  times in the past year have you...    Used an illegal drug or used a prescription medication for non-medical reasons? Never Filed at: 09/05/2024 0906                      Medical Decision Making  Differential diagnose includes acute fracture, acute dislocation, soft tissue injury and others.    Use Tylenol, Motrin and ice for pain relief.    Avoid activities and sports that may cause further injury, stress or pain.     If pain continues or worsens or fails to improve, follow up with podiatry, orthopedics, PCP or in the ER      Amount and/or Complexity of Data Reviewed  Radiology: ordered and independent interpretation performed. Decision-making details documented in ED Course.    Risk  OTC drugs.  Prescription drug management.                 Disposition  Final diagnoses:   Toe pain, left   Toe fracture, left     Time reflects when diagnosis was documented in both MDM as applicable and the Disposition within this note       Time User Action Codes Description Comment    9/5/2024 10:10 AM Jenny Domingo [M79.675] Toe pain, left     9/5/2024 10:10 AM Jenny Domingo [S92.912A] Toe fracture, left           ED Disposition       ED Disposition   Discharge    Condition   Stable    Date/Time   Thu Sep 5, 2024 1010    Comment   Cheri Willingham discharge to home/self care.                   Follow-up Information    None         Patient's Medications   Discharge Prescriptions    ACETAMINOPHEN (TYLENOL) 650 MG CR TABLET    Take 1 tablet (650 mg total) by mouth every 8 (eight) hours as needed for mild pain       Start Date: 9/5/2024  End Date: --       Order Dose: 650 mg       Quantity: 30 tablet    Refills: 0    NAPROXEN (NAPROSYN) 500 MG TABLET    Take 1 tablet (500 mg total) by mouth 2 (two) times a day with meals       Start Date: 9/5/2024  End Date: --       Order Dose: 500 mg       Quantity: 30 tablet    Refills: 0           PDMP Review       None            ED Provider  Electronically Signed by              Jenny Domingo MD  09/05/24 1010

## 2024-09-05 NOTE — TELEPHONE ENCOUNTER
Hello,    Please advise if a forced appointment can be accommodated for the patient:    Call back #: [phone]    Insurance: [employer]    Reason for appointment: FX toe left foot/Ref in Epic/ER    Requested doctor and/or location: Dr. Collado/Dr. Lambert in [place] office      Thank you.

## 2024-09-11 ENCOUNTER — OFFICE VISIT (OUTPATIENT)
Dept: OBGYN CLINIC | Facility: CLINIC | Age: 44
End: 2024-09-11
Payer: MEDICARE

## 2024-09-11 VITALS — BODY MASS INDEX: 30.86 KG/M2 | WEIGHT: 192 LBS | HEIGHT: 66 IN

## 2024-09-11 DIAGNOSIS — L84 CORN OF FOOT: Primary | ICD-10-CM

## 2024-09-11 DIAGNOSIS — M79.675 TOE PAIN, LEFT: ICD-10-CM

## 2024-09-11 DIAGNOSIS — S92.912A TOE FRACTURE, LEFT: ICD-10-CM

## 2024-09-11 PROCEDURE — 99203 OFFICE O/P NEW LOW 30 MIN: CPT | Performed by: FAMILY MEDICINE

## 2024-09-11 NOTE — PATIENT INSTRUCTIONS
Patient Education     How to Do an Ice Massage   About this topic   Using ice after an injury can help in a few ways. It can lessen swelling, inflammation, and pain. Doing an ice massage to your injured area is an easy thing to do at home. You will need some small paper or foam cups, water, and a freezer.  General   An ice massage is most helpful in the first 2 days of the injury. It can also be done after exercising. It is normal to have different feelings during the ice massage. These include cold, burning, aching, and numbness.  Fill a few small paper or foam cups close to the top with water.  Put them in the freezer for a few hours until they are fully solid.  Remove a frozen cup from the freezer. Tear the paper or foam from the top part of the cup until some of the ice is showing.  Move the ice over the injured place. Move the ice in circles or back and forth. Do this for 5 to 10 minutes at a time. Do not keep the ice in one place. You must keep it moving over the injured area. Do not use the ice massage for longer than 10 minutes at a time. This could cause frostbite and injure your skin.  Repeat this 3 to 5 times per day. Leave at least an hour in between icing sessions.  Avoid using the injured body part for about an hour until the tissue has warmed back up.  It is best to raise the injured part above the level of your heart during the ice massage.  It is normal for your skin to be pink or red after an ice massage. If you get any redness that does not go away or blistering, stop using the ice.  Helpful tips   Have a towel under the area you are icing to avoid a mess from the melting ice.  Paper or foam cups work the best. Plastic cups can have rough edges which may cut the skin.  Last Reviewed Date   2021-02-11  Consumer Information Use and Disclaimer   This generalized information is a limited summary of diagnosis, treatment, and/or medication information. It is not meant to be comprehensive and should be  used as a tool to help the user understand and/or assess potential diagnostic and treatment options. It does NOT include all information about conditions, treatments, medications, side effects, or risks that may apply to a specific patient. It is not intended to be medical advice or a substitute for the medical advice, diagnosis, or treatment of a health care provider based on the health care provider's examination and assessment of a patient’s specific and unique circumstances. Patients must speak with a health care provider for complete information about their health, medical questions, and treatment options, including any risks or benefits regarding use of medications. This information does not endorse any treatments or medications as safe, effective, or approved for treating a specific patient. [employer] and its affiliates disclaim any warranty or liability relating to this information or the use thereof. The use of this information is governed by the Terms of Use, available at https://www.[employer].com/en/know/clinical-effectiveness-terms   Copyright   Copyright © 2024 [employer] and its affiliates and/or licensors. All rights reserved.

## 2024-09-11 NOTE — PROGRESS NOTES
Assessment:     1. [place] of foot  Ambulatory Referral to Podiatry    Post Op Shoe      2. Toe pain, left  Ambulatory Referral to Podiatry    Ambulatory Referral to Podiatry    Post Op Shoe      3. Toe fracture, left  Ambulatory Referral to Podiatry    Ambulatory Referral to Podiatry    Post Op Shoe        Orders Placed This Encounter   Procedures    Post Op Shoe    Ambulatory Referral to Podiatry        Impression:   Left toe pain likely secondary to fifth proximal phalanx fracture.    Date of injury: 9/05/2024  Mechanism of injury: Her toe on a weight/dumbbell  Follow-up from injury 6 days    Conservative Management   We discussed different treatment options:  Reviewed ED documentation completed on 09/05/2024.  Treatment plan at that time consisted of x-rays and referral to Ortho/sports medicine  Ice or Heat Therapy as needed 1-2 times daily for 10-20 minutes. As tolerated.   Over the counter Tylenol and/or NSAIDs  as needed based off your Past Medical Hx. Please follow product label for dosing and maximum limits.    Will complete postop shoe for 2 weeks and then slowly transition into supportive shoe during the third week.  Patient also has corns and concerns about toenails will have patient follow-up for fracture management and additional podiatry needs with podiatry.  Referral placed.        Imaging   Reviewed prior xrays obtain in Urgent or Emergency Department. These were reviewed in office with patient today.   09/05/2024 left toe x-ray: Acute nondisplaced oblique fifth proximal phalanx fracture    Procedure  Not appropriate at this time.     Shared decision making, patient agreeable to plan.      Return for Follow-up with podiatry in 3 weeks for corn, nail treatment, fifth proximal phalanx fracture.    HPI:   Cheri Willingham is a 43 y.o. female  who presents for evaluation of   Chief Complaint   Patient presents with    Left Foot - Swelling, Fracture, Pain       Onset/Mechanism: Patient was at home when  unfortunately she struck her toe against a dumbbell on the ground.  Causing pain.  Was seen in the ED..  Location: Fifth toe.  Severity: Current severity: 610.   Pain described as: Throbbing/pulsating   Radiation: Denies.  Provocative: Pain up the steps and down the steps..  Associated symptoms: Bruising and swelling..    Denies any hx of fracture of affected limb.   Denies any surgical history of affected limb.      Summary of treatment to-date:   Nothing to date      Following History Reviewed and Updated     Past Medical History:   Diagnosis Date    Depression      Past Surgical History:   Procedure Laterality Date    AXILLARY SURGERY Right 2013     SECTION  2016     Family History   Problem Relation Age of Onset    Hypertension Family     Cancer Neg Hx     Breast cancer Neg Hx     Colon cancer Neg Hx     Ovarian cancer Neg Hx        Social History     Substance and Sexual Activity   Alcohol Use Not Currently    Comment: couple times/month     Social History     Substance and Sexual Activity   Drug Use Never     Social History     Tobacco Use   Smoking Status Never    Passive exposure: Never   Smokeless Tobacco Never       Social Determinants of Health     Tobacco Use: Low Risk  (2024)    Patient History     Smoking Tobacco Use: Never     Smokeless Tobacco Use: Never     Passive Exposure: Never   Alcohol Use: Not on file   Financial Resource Strain: Low Risk  (2023)    Overall Financial Resource Strain (CARDIA)     Difficulty of Paying Living Expenses: Not hard at all   Food Insecurity: No Food Insecurity (2023)    Hunger Vital Sign     Worried About Running Out of Food in the Last Year: Never true     Ran Out of Food in the Last Year: Never true   Transportation Needs: No Transportation Needs (2023)    PRAPARE - Transportation     Lack of Transportation (Medical): No     Lack of Transportation (Non-Medical): No   Recent Concern: Transportation Needs - Unmet Transportation Needs  "(4/18/2023)    PRAPARE - Transportation     Lack of Transportation (Medical): Yes     Lack of Transportation (Non-Medical): Yes   Physical Activity: Not on file   Stress: Not on file   Social Connections: Not on file   Intimate Partner Violence: Not on file   Depression: Not at risk (5/23/2023)    PHQ-2     PHQ-2 Score: 2   Housing Stability: Not on file   Utilities: Not on file   Health Literacy: Not on file        No Known Allergies    Review of Systems      Review of Systems     Review of Systems   Constitutional: Negative for chills and fever.   HENT: Negative for drooling and sneezing.    Eyes: Negative for redness.   Respiratory: Negative for cough and wheezing.    Gastrointestinal: Negative for vomiting.   Psychiatric/Behavioral: Negative for behavioral problems. The patient is not nervous/anxious.      All other systems negative.   Physical Exam   Physical Exam    Vitals and nursing note reviewed.  Constitutional:   Appearance. Normal Appearance.  Ht 5' 6\" (1.676 m)   Wt 87.1 kg (192 lb)   LMP 08/13/2024 (Approximate)   BMI 30.99 kg/m²     Body mass index is 30.99 kg/m².   HENT:  Head: Atraumatic.  Nose: Nose normal  Eyes: Conjunctiva/sclera: Conjunctivae normal.  Cardiovascular:   Rate and Rhythm: Bilateral equal distal pulses  Pulmonary:   Effort: Pulmonary effort is normal  Skin:   General: Skin is warm and dry.  Neurological:   General: No focal deficit present.  Mental Status: Alert and oriented to person, place, and time.   Psychiatric:   Mood and Affect: mood normal.  Behavior: Behavior normal     Musculoskeletal Exam     Ortho Exam     Left foot    INSPECTION:  Multiple corns on the plantar aspect of the foot  Gait Erythema Ecchymosis Swelling Increased warmth   Not assessed Neg. Neg. Neg.  Neg.     PALPATION/TENDERNESS:  Discomfort at the fifth proximal phalanx        RANGE OF MOTION  Grossly intact  Dorsiflexion Plantarflexion Supination Pronation           STRENGTH: Grossly intact  Dorsiflexion " "Plantarflexion Pronation Supination           ACHILLES TENDON:  Palpable Gap or Defect of Achilles Angle of Declination Valencia's Calf Squeeze Test Matles Test      patient prone, intact and symmetric plantarflexion of ankle when flexing knee   none      Neurovascular:  Sensation to light touch Skin   Grossly intact Warm and dry     (Test not completed if space left blank )           Procedures       Portions of the record may have been created with voice recognition software. Occasional wrong word or \"sound alike\" substitutions may have occurred due to the inherent limitations of voice recognition software. Please review the chart carefully and recognize, using context, where substitutions/typographical errors may have occurred.   "

## 2024-10-04 ENCOUNTER — OFFICE VISIT (OUTPATIENT)
Dept: PODIATRY | Facility: CLINIC | Age: 44
End: 2024-10-04
Payer: MEDICARE

## 2024-10-04 VITALS — BODY MASS INDEX: 30.86 KG/M2 | HEIGHT: 66 IN | WEIGHT: 192 LBS

## 2024-10-04 DIAGNOSIS — B35.3 TINEA PEDIS OF BOTH FEET: Primary | ICD-10-CM

## 2024-10-04 DIAGNOSIS — S92.912A TOE FRACTURE, LEFT: ICD-10-CM

## 2024-10-04 DIAGNOSIS — B35.1 ONYCHOMYCOSIS: ICD-10-CM

## 2024-10-04 DIAGNOSIS — L84 CORN OF FOOT: ICD-10-CM

## 2024-10-04 DIAGNOSIS — L85.3 XEROSIS CUTIS: ICD-10-CM

## 2024-10-04 DIAGNOSIS — M79.675 TOE PAIN, LEFT: ICD-10-CM

## 2024-10-04 DIAGNOSIS — L81.6 SKIN HYPOPIGMENTATION: ICD-10-CM

## 2024-10-04 PROCEDURE — 99212 OFFICE O/P EST SF 10 MIN: CPT

## 2024-10-04 RX ORDER — AMMONIUM LACTATE 12 G/100G
CREAM TOPICAL AS NEEDED
Qty: 140 G | Refills: 3 | Status: SHIPPED | OUTPATIENT
Start: 2024-10-04

## 2024-10-04 RX ORDER — CLOTRIMAZOLE 1 %
CREAM (GRAM) TOPICAL 2 TIMES DAILY
Qty: 113 G | Refills: 3 | Status: SHIPPED | OUTPATIENT
Start: 2024-10-04 | End: 2024-11-03

## 2024-10-04 RX ORDER — CICLOPIROX 80 MG/ML
SOLUTION TOPICAL
Qty: 6.6 ML | Refills: 0 | Status: SHIPPED | OUTPATIENT
Start: 2024-10-04

## 2024-10-04 NOTE — ASSESSMENT & PLAN NOTE
Orders:    Ambulatory Referral to Podiatry    ciclopirox (PENLAC) 8 % solution; Apply topically daily at bedtime

## 2024-10-04 NOTE — PROGRESS NOTES
Ambulatory Visit  Name: Cheri Willingham      : 1980      MRN: [id number]  Encounter Provider: Raul Lambert DPM  Encounter Date: 10/4/2024   Encounter department: [facility] PODIAT[facility] [place]    Assessment & Plan  Toe pain, left    Orders:    Ambulatory Referral to Podiatry    Toe fracture, left    Orders:    Ambulatory Referral to Podiatry    [place] of foot    Orders:    Ambulatory Referral to Podiatry    ciclopirox (PENLAC) 8 % solution; Apply topically daily at bedtime    Tinea pedis of both feet    Orders:    ciclopirox (PENLAC) 8 % solution; Apply topically daily at bedtime    clotrimazole (LOTRIMIN) 1 % cream; Apply topically 2 (two) times a day Apply thin layer to affected areas twice daily.    Onychomycosis    Orders:    ciclopirox (PENLAC) 8 % solution; Apply topically daily at bedtime    clotrimazole (LOTRIMIN) 1 % cream; Apply topically 2 (two) times a day Apply thin layer to affected areas twice daily.    Xerosis cutis    Orders:    ammonium lactate (LAC-HYDRIN) 12 % cream; Apply topically as needed for dry skin    Skin hypopigmentation           Plan:    Today I discussed with the patient treatment for fungal skin infection.  Treatments can include over-the-counter medications such as topical antifungal medications like clotrimazole.  These need to be used twice daily to be effective  I also discussed the option of prescription oral antifungal medication in addition to topical steroid. Prescription for Lotrimin cream sent to patients pharmacy.  If no improvement can consider oral antifungal medication with evaluation of labs.  Discussed with patient to wash socks in hot water.  Additionally can use Lysol disinfectant spray in shoes. Change socks during the day.  Wash bathroom surfaces that are shared and have anyone that shares common areas treated if he/she has a similar rash on the foot to prevent continued reinfection.   Hyperkeratotic tissue was pared utilizing a #15 blade x4 without  "incident to level of healthy epidermis  Recommend use of moisturizer to feet daily  Recommend use of pumice stone to callused areas  Left 5th digit fracture stable and asymptomatic, okay to transition to stable sneaker as tolerated  Rx for penlac   Can follow up in 6 months    History of Present Illness     Cheri Willingham is a 43 y.o. female who presents plantar left foot calluses and dry skin.  Patient reports left fifth toe is not painful today has been feeling good with minimal swelling.  She is concerned about skin lightening on the inside of her left foot, she states this has been ongoing for several years and only uses lotion and oil to feet.  Plantar calluses on left foot bother her the most today.  Additionally the toenails on her left foot, are thickened and discolored.  She has no other podiatric concerns at this time.    History obtained from : patient  Review of Systems  Current Outpatient Medications on File Prior to Visit   Medication Sig Dispense Refill    acetaminophen (TYLENOL) 650 mg CR tablet Take 1 tablet (650 mg total) by mouth every 8 (eight) hours as needed for mild pain 30 tablet 0    fluticasone (FLONASE) 50 mcg/act nasal spray 1 spray into each nostril daily 16 g 0    ibuprofen (MOTRIN) 400 mg tablet Take 1 tablet (400 mg total) by mouth every 6 (six) hours as needed for mild pain 12 tablet 0    menthol-cetylpyridinium (CEPACOL) 3 MG lozenge Take 1 lozenge (3 mg total) by mouth as needed for sore throat 30 lozenge 0    naproxen (Naprosyn) 500 mg tablet Take 1 tablet (500 mg total) by mouth 2 (two) times a day with meals 30 tablet 0     No current facility-administered medications on file prior to visit.          Objective     Ht 5' 6\" (1.676 m)   Wt 87.1 kg (192 lb)   LMP 08/13/2024 (Approximate)   BMI 30.99 kg/m²     Physical Exam      Vascular:  -DP and PT pulses intact b/l  -Capillary refill time <2 sec b/l    MSK:  -No pain on palpation noted  -MMT is 5/5 to all muscle compartments " of the lower extremity  -No gross deformity noted  -minimal pain on palpation left 5th digit    Neuro:  -Light sensation intact bilaterally  -Protective sensation intact bilaterally    Derm:  -I note maceration, fissuring, erythema, vesicular rash with peeling of the left lower extremity. This is consistent with tinea pedis  -Digital nails are Discolored, thickened, elongated  -plantar callus formation noted on exam x4, plantar left forefoot  -lightening of skin left medial foot

## 2025-01-06 ENCOUNTER — TELEPHONE (OUTPATIENT)
Dept: OBGYN CLINIC | Facility: CLINIC | Age: 45
End: 2025-01-06

## 2025-01-06 NOTE — TELEPHONE ENCOUNTER
Voicemail: Hi my name is Julio Willingham I need to make an appointment.     Patient was called and scheduled for 2/7/25.

## 2025-02-07 ENCOUNTER — TELEPHONE (OUTPATIENT)
Dept: OBGYN CLINIC | Facility: CLINIC | Age: 45
End: 2025-02-07

## 2025-02-07 ENCOUNTER — TELEPHONE (OUTPATIENT)
Dept: FAMILY MEDICINE CLINIC | Facility: CLINIC | Age: 45
End: 2025-02-07

## 2025-03-18 ENCOUNTER — APPOINTMENT (OUTPATIENT)
Dept: LAB | Facility: CLINIC | Age: 45
End: 2025-03-18
Payer: MEDICARE

## 2025-03-18 ENCOUNTER — ANNUAL EXAM (OUTPATIENT)
Dept: OBGYN CLINIC | Facility: CLINIC | Age: 45
End: 2025-03-18

## 2025-03-18 VITALS
BODY MASS INDEX: 30.95 KG/M2 | WEIGHT: 192.6 LBS | SYSTOLIC BLOOD PRESSURE: 130 MMHG | HEIGHT: 66 IN | DIASTOLIC BLOOD PRESSURE: 70 MMHG

## 2025-03-18 DIAGNOSIS — Z11.3 SCREEN FOR STD (SEXUALLY TRANSMITTED DISEASE): ICD-10-CM

## 2025-03-18 DIAGNOSIS — Z12.11 SCREENING FOR COLON CANCER: ICD-10-CM

## 2025-03-18 DIAGNOSIS — Z01.419 ENCOUNTER FOR GYNECOLOGICAL EXAMINATION WITHOUT ABNORMAL FINDING: Primary | ICD-10-CM

## 2025-03-18 DIAGNOSIS — Z12.31 ENCOUNTER FOR SCREENING MAMMOGRAM FOR MALIGNANT NEOPLASM OF BREAST: ICD-10-CM

## 2025-03-18 DIAGNOSIS — Z12.4 SCREENING FOR CERVICAL CANCER: ICD-10-CM

## 2025-03-18 DIAGNOSIS — N91.2 AMENORRHEA: ICD-10-CM

## 2025-03-18 DIAGNOSIS — Z23 NEED FOR HPV VACCINE: ICD-10-CM

## 2025-03-18 DIAGNOSIS — Z12.39 ENCOUNTER FOR BREAST CANCER SCREENING USING NON-MAMMOGRAM MODALITY: ICD-10-CM

## 2025-03-18 LAB — FSH SERPL-ACNC: 30.1 MIU/ML

## 2025-03-18 PROCEDURE — 86780 TREPONEMA PALLIDUM: CPT

## 2025-03-18 PROCEDURE — 87389 HIV-1 AG W/HIV-1&-2 AB AG IA: CPT

## 2025-03-18 PROCEDURE — 36415 COLL VENOUS BLD VENIPUNCTURE: CPT

## 2025-03-18 PROCEDURE — 87491 CHLMYD TRACH DNA AMP PROBE: CPT | Performed by: NURSE PRACTITIONER

## 2025-03-18 PROCEDURE — 87661 TRICHOMONAS VAGINALIS AMPLIF: CPT | Performed by: NURSE PRACTITIONER

## 2025-03-18 PROCEDURE — 87340 HEPATITIS B SURFACE AG IA: CPT

## 2025-03-18 PROCEDURE — 99396 PREV VISIT EST AGE 40-64: CPT | Performed by: NURSE PRACTITIONER

## 2025-03-18 PROCEDURE — 87563 M. GENITALIUM AMP PROBE: CPT | Performed by: NURSE PRACTITIONER

## 2025-03-18 PROCEDURE — 90471 IMMUNIZATION ADMIN: CPT | Performed by: NURSE PRACTITIONER

## 2025-03-18 PROCEDURE — 87591 N.GONORRHOEAE DNA AMP PROB: CPT | Performed by: NURSE PRACTITIONER

## 2025-03-18 PROCEDURE — 83001 ASSAY OF GONADOTROPIN (FSH): CPT

## 2025-03-18 PROCEDURE — 90651 9VHPV VACCINE 2/3 DOSE IM: CPT | Performed by: NURSE PRACTITIONER

## 2025-03-18 PROCEDURE — 86803 HEPATITIS C AB TEST: CPT

## 2025-03-18 NOTE — PROGRESS NOTES
"ANNUAL GYNECOLOGICAL EXAMINATION    Cheri Willingham is a 44 y.o. female who presents today for annual GYN exam.  Her last pap smear was performed 2023 and result was NILM with negative HPV.  She reports no history of abnormal pap smears in her past.  She has never had a mammogram performed previously.  She has never had colon cancer screening performed.  She had HIV screening performed 2023 and it was negative.  She reports menses as absent since \"sometime last year\".  Her general medical history has been reviewed and she reports it as follows:    Past Medical History:   Diagnosis Date    Depression      Past Surgical History:   Procedure Laterality Date    AXILLARY SURGERY Right 2013     SECTION  2016     OB History          1    Para   1    Term   1       0    AB   0    Living   1         SAB   0    IAB   0    Ectopic   0    Multiple   0    Live Births   1               Social History     Tobacco Use    Smoking status: Never     Passive exposure: Never    Smokeless tobacco: Never   Vaping Use    Vaping status: Never Used   Substance Use Topics    Alcohol use: Not Currently     Comment: couple times/month    Drug use: Never     Social History     Substance and Sexual Activity   Sexual Activity Yes    Partners: Male    Birth control/protection: None     Cancer-related family history is negative for Cancer, Breast cancer, Colon cancer, and Ovarian cancer.    No current outpatient medications      Review of Systems:  Review of Systems   Constitutional: Negative.    Gastrointestinal: Negative.    Genitourinary:  Negative for difficulty urinating, menstrual problem, pelvic pain and vaginal discharge.   Skin: Negative.        Physical Exam:  /70 (BP Location: Left arm, Patient Position: Sitting, Cuff Size: Standard)   Ht 5' 6\" (1.676 m)   Wt 87.4 kg (192 lb 9.6 oz)   BMI 31.09 kg/m²   Physical Exam  Constitutional:       General: She is not in acute distress.     Appearance: She is " well-developed.   Genitourinary:      Vulva normal.      No lesions in the vagina.        Right Adnexa: not tender and no mass present.     Left Adnexa: not tender and no mass present.     No cervical motion tenderness or lesion.      Uterus is not tender.   Breasts:     Right: No mass, nipple discharge, skin change or tenderness.      Left: No mass, nipple discharge, skin change or tenderness.   Neck:      Thyroid: No thyromegaly.   Cardiovascular:      Rate and Rhythm: Normal rate and regular rhythm.   Pulmonary:      Effort: Pulmonary effort is normal.   Abdominal:      Palpations: Abdomen is soft.      Tenderness: There is no abdominal tenderness.   Musculoskeletal:      Cervical back: Neck supple.   Neurological:      Mental Status: She is alert and oriented to person, place, and time.   Skin:     General: Skin is warm and dry.   Vitals reviewed.       Assessment/Plan:   1. Normal well-woman GYN exam.  2. Cervical cancer screening:  Normal cervical exam.  Pap smear not indicated at this time.  Has received HPV vaccine x2 doses last year, but she desires to complete the vaccine series now.  Given HPV vaccine today.    3. STD screening:  Orders placed for vaginal GC/CT, trichomonas/mycoplasma genitalium cultures.  Orders placed for serum anti-HIV, anti-HCV, HbsAg, syphilis panel.   4. Breast cancer screening:  Normal breast exam.  Order placed for bilateral screening mammogram.  Reviewed breast self-awareness.   5. Colon cancer screening:  Order placed for consultation with Gastroenterology for consultation to discuss screening.   6. Depression Screening: Patient's depression screening was assessed with a PHQ-2 score of 1. Clinically patient does not have depression. No treatment is required.   6. BMI Counseling: Body mass index is 31.09 kg/m². Discussed the patient's BMI with her. The BMI is above normal. Nutrition recommendations include reducing portion sizes and decreasing overall calorie intake.   7.  Contraception:  Declines.   8. Amenorrhea:  Order placed for FSH.   9. Return to office in 1 year for annual GYN exam.    Reviewed with patient that test results are available in [facility] immediately, but that they will not necessarily be reviewed by me immediately.  Explained that I will review results at my earliest opportunity and contact patient appropriately.

## 2025-03-18 NOTE — PATIENT INSTRUCTIONS
Thank you for your confidence in our team.   We appreciate you and welcome your feedback.   If you receive a survey from us, please take a few moments to let us know how we are doing.   Sincerely,  JOB Egan       OBESITY     Obesity is defined as a body mass index (BMI) which is greater than 30. Your Body mass index is 31.09 kg/m²..    The risks of obesity include  many health problems, such as injuries or physical disability. You may need tests to check for the following:  Diabetes     High blood pressure or high cholesterol     Heart disease     Gallbladder or liver disease     Cancer of the colon, breast, prostate, liver, or kidney     Sleep apnea     Arthritis or gout    Seek care immediately if:   You have a severe headache, confusion, or difficulty speaking.     You have weakness on one side of your body.     You have chest pain, sweating, or shortness of breath.    Contact your healthcare provider if:   You have symptoms of gallbladder or liver disease, such as pain in your upper abdomen.    You have knee or hip pain and discomfort while walking.     You have symptoms of diabetes, such as intense hunger and thirst, and frequent urination.     You have symptoms of sleep apnea, such as snoring or daytime sleepiness.     You have questions or concerns about your condition or care.    Treatment for obesity  focuses on helping you lose weight to improve your health. Even a small decrease in BMI can reduce the risk for many health problems. Your healthcare provider will help you set a weight-loss goal.  Lifestyle changes  are the first step in treating obesity. These include making healthy food choices and getting regular physical activity. Your healthcare provider may suggest a weight-loss program that involves coaching, education, and therapy.     Medicine  may help you lose weight when it is used with a healthy diet and physical activity.     Surgery  can help you lose weight if you are very  obese and have other health problems. There are several types of weight-loss surgery. Ask your healthcare provider for more information.    Be successful losing weight:   Set small, realistic goals.  An example of a small goal is to walk for 20 minutes 5 days a week. Anther goal is to lose 5% of your body weight.    Tell friends, family members, and coworkers about your goals  and ask for their support. Ask a friend to lose weight with you, or join a weight-loss support group.    Identify foods or triggers that may cause you to overeat , and find ways to avoid them. Remove tempting high-calorie foods from your home and workplace. Place a bowl of fresh fruit on your kitchen counter. If stress causes you to eat, then find other ways to cope with stress.     Keep a diary to track what you eat and drink.  Also write down how many minutes of physical activity you do each day. Weigh yourself once a week and record it in your diary.    Eating changes:  You will need to eat 500 to 1,000 fewer calories each day than you currently eat to lose 1 to 2 pounds a week. The following changes will help you cut calories:  Eat smaller portions.  Use small plates, no larger than 9 inches in diameter. Fill your plate half full of fruits and vegetables. Measure your food using measuring cups until you know what a serving size looks like.     Eat 3 meals and 1 or 2 snacks each day.  Plan your meals in advance. Cook and eat at home most of the time. Eat slowly.     Eat fruits and vegetables at every meal.  They are low in calories and high in fiber, which makes you feel full. Do not add butter, margarine, or cream sauce to vegetables. Use herbs to season steamed vegetables.     Eat less fat and fewer fried foods.  Eat more baked or grilled chicken and fish. These protein sources are lower in calories and fat than red meat. Limit fast food. Dress your salads with olive oil and vinegar instead of bottled dressing.     Limit the amount of  sugar you eat.  Do not drink sugary beverages. Limit alcohol.    Activity changes:  Physical activity is good for your body in many ways. It helps you burn calories and build strong muscles. It decreases stress and depression, and improves your mood. It can also help you sleep better. Talk to your healthcare provider before you begin an exercise program.  Exercise for at least 30 minutes 5 days a week.  Start slowly. Set aside time each day for physical activity that you enjoy and that is convenient for you. It is best to do both weight training and an activity that increases your heart rate, such as walking, bicycling, or swimming.     Find ways to be more active.  Do yard work and housecleaning. Walk up the stairs instead of using elevators. Spend your leisure time going to events that require walking, such as outdoor festivals or fairs. This extra physical activity can help you lose weight and keep it off.    Follow up with your primary healthcare provider as directed.  You may need to meet with a dietitian. Write down your questions so you remember to ask them during your visits.

## 2025-03-18 NOTE — PROGRESS NOTES
Patient given 3rd hpv on right deltoid on 3/17/25    NDC# [id number]  LOT# [id number]  EXP# [id number]

## 2025-03-18 NOTE — LETTER
3/19/2025    To Cheri Willingham  : 1980      This letter is to advise you that your recent STD CULTURES for gonorrhea, chlamydia, trichomonas, and mycoplasma genitalium were reviewed by me and are NORMAL.  Please contact the office for an appointment if you have any additional concerns.    JOB Egan

## 2025-03-19 ENCOUNTER — RESULTS FOLLOW-UP (OUTPATIENT)
Dept: OBGYN CLINIC | Facility: CLINIC | Age: 45
End: 2025-03-19

## 2025-03-19 LAB
C TRACH DNA SPEC QL NAA+PROBE: NEGATIVE
HIV 1+2 AB+HIV1 P24 AG SERPL QL IA: NORMAL
M GENITALIUM DNA SPEC QL NAA+PROBE: NEGATIVE
N GONORRHOEA DNA SPEC QL NAA+PROBE: NEGATIVE
T VAGINALIS DNA SPEC QL NAA+PROBE: NEGATIVE
TREPONEMA PALLIDUM IGG+IGM AB [PRESENCE] IN SERUM OR PLASMA BY IMMUNOASSAY: NORMAL

## 2025-03-20 LAB
HBV SURFACE AG SER QL: NORMAL
HCV AB SER QL: NORMAL

## 2025-03-24 ENCOUNTER — TELEPHONE (OUTPATIENT)
Dept: OBGYN CLINIC | Facility: CLINIC | Age: 45
End: 2025-03-24

## 2025-03-24 NOTE — TELEPHONE ENCOUNTER
Pt called asking about the Normal culture letter sent to her home. Was unsure what that meant, I explained to pt that everything is Normal. She states understanding

## 2025-03-25 NOTE — TELEPHONE ENCOUNTER
PT called today requesting to speak with Ann haile; her labs and the HIV.  Please contact her at [phone].

## 2025-03-28 ENCOUNTER — HOSPITAL ENCOUNTER (OUTPATIENT)
Dept: MAMMOGRAPHY | Facility: CLINIC | Age: 45
End: 2025-03-28
Payer: MEDICARE

## 2025-03-28 DIAGNOSIS — Z12.31 ENCOUNTER FOR SCREENING MAMMOGRAM FOR MALIGNANT NEOPLASM OF BREAST: ICD-10-CM

## 2025-03-28 PROCEDURE — 77063 BREAST TOMOSYNTHESIS BI: CPT

## 2025-03-28 PROCEDURE — 77067 SCR MAMMO BI INCL CAD: CPT

## 2025-04-24 ENCOUNTER — TELEPHONE (OUTPATIENT)
Dept: GASTROENTEROLOGY | Facility: MEDICAL CENTER | Age: 45
End: 2025-04-24

## 2025-04-24 NOTE — TELEPHONE ENCOUNTER
3 attempts to contact patient to reschedule NP appointment with Gastro for 06/09/2025. Dr. Cherry not in office that day.

## 2025-05-02 ENCOUNTER — TELEPHONE (OUTPATIENT)
Dept: OBGYN CLINIC | Facility: CLINIC | Age: 45
End: 2025-05-02

## 2025-05-02 NOTE — TELEPHONE ENCOUNTER
Please confirm pt's address, because letters were sent to patient on 3/19/25 and 3/20/25 advising her test results were normal and she should have received them.

## 2025-05-02 NOTE — TELEPHONE ENCOUNTER
Voicemail:Hi, my name is Cheri Willingham and I'm calling the office for Nichol Weiner because she don't want her sign my chart for the, I think the milligram and I'm calling to see what my results was because I haven't received no call or no letter. My phone number is [phone], my phone number is [phone]. And I'll be waiting for your call. And thank you and have a blessed day.    Patient would like a betty back regarding Mammogram results.